# Patient Record
Sex: FEMALE | Race: WHITE | NOT HISPANIC OR LATINO | Employment: STUDENT | URBAN - NONMETROPOLITAN AREA
[De-identification: names, ages, dates, MRNs, and addresses within clinical notes are randomized per-mention and may not be internally consistent; named-entity substitution may affect disease eponyms.]

---
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
## 2020-03-10 ENCOUNTER — HEALTH MAINTENANCE LETTER (OUTPATIENT)
Age: 20
End: 2020-03-10

## 2020-03-27 ENCOUNTER — OFFICE VISIT (OUTPATIENT)
Dept: FAMILY MEDICINE | Facility: OTHER | Age: 20
End: 2020-03-27
Attending: FAMILY MEDICINE
Payer: COMMERCIAL

## 2020-03-27 VITALS
WEIGHT: 127 LBS | DIASTOLIC BLOOD PRESSURE: 72 MMHG | HEART RATE: 79 BPM | HEIGHT: 66 IN | BODY MASS INDEX: 20.41 KG/M2 | SYSTOLIC BLOOD PRESSURE: 110 MMHG | OXYGEN SATURATION: 99 % | TEMPERATURE: 99 F | RESPIRATION RATE: 16 BRPM

## 2020-03-27 DIAGNOSIS — J45.990 EXERCISE-INDUCED BRONCHOSPASM: ICD-10-CM

## 2020-03-27 PROCEDURE — 99213 OFFICE O/P EST LOW 20 MIN: CPT | Performed by: FAMILY MEDICINE

## 2020-03-27 RX ORDER — HYDROXYZINE HYDROCHLORIDE 25 MG/1
25-50 TABLET, FILM COATED ORAL EVERY 6 HOURS PRN
COMMUNITY
Start: 2020-01-28 | End: 2021-07-16

## 2020-03-27 RX ORDER — ALBUTEROL SULFATE 90 UG/1
2 AEROSOL, METERED RESPIRATORY (INHALATION) EVERY 4 HOURS PRN
Qty: 1 INHALER | Refills: 1 | Status: SHIPPED | OUTPATIENT
Start: 2020-03-27 | End: 2021-07-16

## 2020-03-27 ASSESSMENT — PAIN SCALES - GENERAL: PAINLEVEL: NO PAIN (0)

## 2020-03-27 ASSESSMENT — MIFFLIN-ST. JEOR: SCORE: 1366.79

## 2020-03-27 NOTE — PROGRESS NOTES
SUBJECTIVE:   CC:  Marietta Lentz is a 20 year old female who presents to clinic today for the following health issues:  Refill of inhaler    HPI  Marietta Lentz is a 20 year old female in no apparent distress who presents for refill of albuterol inhaler. She has a history of exercise-induced bronchospasm and stopped using her inhaler about 18 months ago as her breathing had gradually improved and she felt it was no longer needed.    About six months ago she noticed a gradual return in episodes of shortness of breath that are triggered by and worse with physical activity.     She reports feeling short of breath and wheezing on a frequency of 3-5 times per week or more.    She has experienced night time awakenings about 3 times per week during this time period.    She has not noticed any specific triggers such as mold, dust, or pet dander. She denies smoking and vapping. No recent sick contacts, change in living environment, or travel.     No syncopal episodes associated with these episodes but feels she has been close.       No Known Allergies  Current Outpatient Medications   Medication     albuterol (PROAIR HFA/PROVENTIL HFA/VENTOLIN HFA) 108 (90 Base) MCG/ACT inhaler     beclomethasone HFA (QVAR REDIHALER) 40 MCG/ACT inhaler     hydrOXYzine (ATARAX) 25 MG tablet     norethindrone-ethinyl estradiol (ORTHO-NOVUM 1-35 TAB,NORTREL 1-35 TAB) 1-35 MG-MCG tablet     sertraline (ZOLOFT) 50 MG tablet     No current facility-administered medications for this visit.       Past Medical History:   Diagnosis Date     Acne     7/12/2013     Exercise-induced bronchospasm     EIB        Review of Systems   Review Of Systems  Skin: negative, negative for, acne, rash, itching  Ears/Nose/Throat: negative for, nasal congestion, purulent rhinorrhea, sneezing, postnasal drainage  Respiratory: Shortness of breath worse with physical activity, No cough and No hemoptysis  Cardiovascular: negative for, palpitations, tachycardia and  "chest pain    OBJECTIVE:     /72   Pulse 79   Temp 99  F (37.2  C) (Tympanic)   Resp 16   Ht 1.683 m (5' 6.25\")   Wt 57.6 kg (127 lb)   LMP 03/07/2020   SpO2 99%   Breastfeeding No   BMI 20.34 kg/m    Body mass index is 20.34 kg/m .  Physical Exam  Constitutional:       Appearance: Normal appearance. She is normal weight.   HENT:      Head: Normocephalic and atraumatic.      Nose: Nose normal.      Mouth/Throat:      Mouth: Mucous membranes are moist.      Pharynx: Oropharynx is clear.   Eyes:      Pupils: Pupils are equal, round, and reactive to light.   Cardiovascular:      Rate and Rhythm: Normal rate and regular rhythm.      Pulses: Normal pulses.      Heart sounds: Normal heart sounds.   Pulmonary:      Effort: Pulmonary effort is normal.      Breath sounds: Normal breath sounds. No wheezing, rhonchi or rales.   Skin:     General: Skin is warm and dry.   Neurological:      Mental Status: She is alert.        No results found for any visits on 03/27/20.      ASSESSMENT/PLAN:       ICD-10-CM    1. Exercise-induced bronchospasm  J45.990 albuterol (PROAIR HFA/PROVENTIL HFA/VENTOLIN HFA) 108 (90 Base) MCG/ACT inhaler     beclomethasone HFA (QVAR REDIHALER) 40 MCG/ACT inhaler        PLAN:  1.  Symptoms consistent with prior diagnosis. No recent sick contacts, change in living environment, or travel. Restart rescue inhaler as well as steroid. Patient was well informed of proper technique including use of spacer to optimize dosing.     ANNIE Bryant   This patient was cared for while under the direct supervision of the following provider.    KARINA MAGDALENO MD  [facility]    This note was created using voice recognition software and was screened for errors in transcription.  "

## 2020-04-13 ENCOUNTER — MYC MEDICAL ADVICE (OUTPATIENT)
Dept: FAMILY MEDICINE | Facility: OTHER | Age: 20
End: 2020-04-13

## 2020-04-14 ENCOUNTER — OFFICE VISIT (OUTPATIENT)
Dept: FAMILY MEDICINE | Facility: OTHER | Age: 20
End: 2020-04-14
Attending: FAMILY MEDICINE
Payer: COMMERCIAL

## 2020-04-14 ENCOUNTER — HOSPITAL ENCOUNTER (OUTPATIENT)
Dept: GENERAL RADIOLOGY | Facility: OTHER | Age: 20
End: 2020-04-14
Attending: FAMILY MEDICINE
Payer: COMMERCIAL

## 2020-04-14 VITALS
DIASTOLIC BLOOD PRESSURE: 78 MMHG | HEIGHT: 66 IN | SYSTOLIC BLOOD PRESSURE: 122 MMHG | WEIGHT: 128.4 LBS | HEART RATE: 119 BPM | OXYGEN SATURATION: 99 % | BODY MASS INDEX: 20.63 KG/M2 | RESPIRATION RATE: 16 BRPM | TEMPERATURE: 97.3 F

## 2020-04-14 DIAGNOSIS — R22.32 LOCALIZED SWELLING ON LEFT HAND: ICD-10-CM

## 2020-04-14 DIAGNOSIS — R22.32 LOCALIZED SWELLING ON LEFT HAND: Primary | ICD-10-CM

## 2020-04-14 PROCEDURE — 99213 OFFICE O/P EST LOW 20 MIN: CPT | Performed by: FAMILY MEDICINE

## 2020-04-14 PROCEDURE — 73130 X-RAY EXAM OF HAND: CPT | Mod: LT

## 2020-04-14 ASSESSMENT — PAIN SCALES - GENERAL: PAINLEVEL: MODERATE PAIN (4)

## 2020-04-14 ASSESSMENT — MIFFLIN-ST. JEOR: SCORE: 1373.14

## 2020-04-14 NOTE — PROGRESS NOTES
"  SUBJECTIVE:   Marietta Lentz is a 20 year old female who presents to clinic today for the following health issues:  Nursing Notes:   Monica Martinez LPN  4/14/2020  2:54 PM  Sign at exiting of workspace  Chief Complaint   Patient presents with     Musculoskeletal Problem     left hand     Patient is here for pain in her left hand that started Saturday after she fell on it. Patient states she had ibuprofen at 10am with no relief.     Initial /78   Pulse 119   Temp 97.3  F (36.3  C) (Tympanic)   Resp 16   Ht 1.683 m (5' 6.25\")   Wt 58.2 kg (128 lb 6.4 oz)   SpO2 99%   BMI 20.57 kg/m   Estimated body mass index is 20.57 kg/m  as calculated from the following:    Height as of this encounter: 1.683 m (5' 6.25\").    Weight as of this encounter: 58.2 kg (128 lb 6.4 oz).  Medication Reconciliation: complete    Monica Martinez LPN      HPI  Pleasant 20-year-old female presents with left hand injury.  Fell backwards and hyper flexed her wrist.  This occurred 3 days ago.  She has been using ibuprofen and icing.  Swelling has improved slightly.  No bruising.        Review of Systems   See HPI  OBJECTIVE:     /78   Pulse 119   Temp 97.3  F (36.3  C) (Tympanic)   Resp 16   Ht 1.683 m (5' 6.25\")   Wt 58.2 kg (128 lb 6.4 oz)   SpO2 99%   BMI 20.57 kg/m    Body mass index is 20.57 kg/m .  Physical Exam  Musculoskeletal:        Hands:       Comments: Mild swelling, no bruising  Decreased ability to make fist  Diffuse tenderness, no focal bony tenderness  Pulses intact  Sensation normal    Tender 1/2nd left MC   Neurological:      Mental Status: She is alert.         Diagnostic Test Results:  No results found for this or any previous visit (from the past 24 hour(s)).    ASSESSMENT/PLAN:           ICD-10-CM    1. Localized swelling on left hand  R22.32 XR Hand Left G/E 3 Views       I reviewed x-rays with the patient.  No obvious fracture.  Recommend ice and some wrist plan for comfort.  Follow-up if no " improvement in 7 to 10 days and can consider repeat x-ray.  Melissa Winn MD  [facility] AND [facility]

## 2020-05-13 DIAGNOSIS — N92.6 IRREGULAR PERIODS: ICD-10-CM

## 2020-05-14 RX ORDER — NORETHINDRONE AND ETHINYL ESTRADIOL 1 MG-35MCG
KIT ORAL
Qty: 84 TABLET | Refills: 2 | Status: SHIPPED | OUTPATIENT
Start: 2020-05-14 | End: 2020-11-02

## 2020-05-28 ENCOUNTER — HOSPITAL ENCOUNTER (OUTPATIENT)
Dept: GENERAL RADIOLOGY | Facility: OTHER | Age: 20
End: 2020-05-28
Attending: FAMILY MEDICINE
Payer: COMMERCIAL

## 2020-05-28 ENCOUNTER — OFFICE VISIT (OUTPATIENT)
Dept: FAMILY MEDICINE | Facility: OTHER | Age: 20
End: 2020-05-28
Attending: FAMILY MEDICINE
Payer: COMMERCIAL

## 2020-05-28 VITALS
TEMPERATURE: 98 F | BODY MASS INDEX: 20.06 KG/M2 | HEIGHT: 67 IN | DIASTOLIC BLOOD PRESSURE: 78 MMHG | WEIGHT: 127.8 LBS | RESPIRATION RATE: 16 BRPM | SYSTOLIC BLOOD PRESSURE: 112 MMHG | HEART RATE: 100 BPM

## 2020-05-28 DIAGNOSIS — M25.571 ACUTE RIGHT ANKLE PAIN: ICD-10-CM

## 2020-05-28 DIAGNOSIS — S93.401A SPRAIN OF RIGHT ANKLE, UNSPECIFIED LIGAMENT, INITIAL ENCOUNTER: ICD-10-CM

## 2020-05-28 DIAGNOSIS — M79.671 RIGHT FOOT PAIN: ICD-10-CM

## 2020-05-28 DIAGNOSIS — M25.571 ACUTE RIGHT ANKLE PAIN: Primary | ICD-10-CM

## 2020-05-28 PROCEDURE — 99213 OFFICE O/P EST LOW 20 MIN: CPT | Performed by: FAMILY MEDICINE

## 2020-05-28 PROCEDURE — 73630 X-RAY EXAM OF FOOT: CPT | Mod: RT

## 2020-05-28 PROCEDURE — 73610 X-RAY EXAM OF ANKLE: CPT | Mod: RT

## 2020-05-28 ASSESSMENT — ENCOUNTER SYMPTOMS
CHILLS: 0
COUGH: 0
ARTHRALGIAS: 1
FEVER: 0

## 2020-05-28 ASSESSMENT — MIFFLIN-ST. JEOR: SCORE: 1381.2

## 2020-05-28 ASSESSMENT — PAIN SCALES - GENERAL: PAINLEVEL: MODERATE PAIN (5)

## 2020-05-28 ASSESSMENT — PATIENT HEALTH QUESTIONNAIRE - PHQ9: SUM OF ALL RESPONSES TO PHQ QUESTIONS 1-9: 0

## 2020-05-28 NOTE — PROGRESS NOTES
"  SUBJECTIVE:   Nursing Notes:   Sharron Israel LPN  5/28/2020 10:03 AM  Signed  Patient presents to the clinic today with complaints of R ankle pain.  Patient states on Saturday she stepped in a hole and twisted it.  Sharron Israel LPN 5/28/2020   9:59 AM    Chief Complaint   Patient presents with     Musculoskeletal Problem     R Ankle       Initial /78 (BP Location: Right arm, Patient Position: Sitting, Cuff Size: Adult Regular)   Pulse 100   Temp 98  F (36.7  C) (Tympanic)   Resp 16   Ht 1.7 m (5' 6.93\")   Wt 58 kg (127 lb 12.8 oz)   LMP 05/14/2020 (Within Days)   Breastfeeding No   BMI 20.06 kg/m   Estimated body mass index is 20.06 kg/m  as calculated from the following:    Height as of this encounter: 1.7 m (5' 6.93\").    Weight as of this encounter: 58 kg (127 lb 12.8 oz).  Medication Reconciliation: complete  Sharron Israel LPN      Marietta Lentz is a 20 year old female who presents to clinic today for a complaint of right ankle pain.  She had stepped in a hole and twisted in 5 days ago.  Had been carrying a 40 lb bag of dirt and did not see the hole.  She was able to put weight on it right away, but had significant swelling right away.  Feels sore and swollen the more she is on it.      HPI    I personally reviewed medications/allergies/history listed below:    Patient Active Problem List    Diagnosis Date Noted     Acne vulgaris 06/08/2016     Priority: Medium     Patella-femoral syndrome 01/22/2014     Priority: Medium     Exercise-induced bronchospasm 07/08/2011     Priority: Medium     Overview:   trial of albuterol, improved symptoms with sports Kely Sherwood MD ....................  3/6/2015   4:36 PM        Past Medical History:   Diagnosis Date     Acne     7/12/2013     Exercise-induced bronchospasm     EIB      Past Surgical History:   Procedure Laterality Date     ESOPHAGOSCOPY, GASTROSCOPY, DUODENOSCOPY (EGD), COMBINED      1/6/2016     TONSILLECTOMY, " "ADENOIDECTOMY, COMBINED      11/6/12,obstructive sleep symptoms     Family History   Problem Relation Age of Onset     Blood Disease Mother         Blood Disease,h/o Protein S and ? 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  Mom- Ashley Ruiz - Mikal  Brother Fabio   Sister Deirdre     Current Outpatient Medications   Medication Sig Dispense Refill     albuterol (PROAIR HFA/PROVENTIL HFA/VENTOLIN HFA) 108 (90 Base) MCG/ACT inhaler Inhale 2 puffs into the lungs every 4 hours as needed for shortness of breath / dyspnea or wheezing 1 Inhaler 1     beclomethasone HFA (QVAR REDIHALER) 40 MCG/ACT inhaler Inhale 2 puffs into the lungs 2 times daily 10.6 g 3     DASETTA 1/35 1-35 MG-MCG tablet TAKE 1 TABLET BY MOUTH ONCE DAILY 84 tablet 2     hydrOXYzine (ATARAX) 25 MG tablet Take 25-50 mg by mouth every 6 hours as needed       sertraline (ZOLOFT) 50 MG tablet Take 50 mg by mouth daily       No Known Allergies    Review of Systems   Constitutional: Negative for chills and fever.   Respiratory: Negative for cough.    Musculoskeletal: Positive for arthralgias.        OBJECTIVE:     /78 (BP Location: Right arm, Patient Position: Sitting, Cuff Size: Adult Regular)   Pulse 100   Temp 98  F (36.7  C) (Tympanic)   Resp 16   Ht 1.7 m (5' 6.93\")   Wt 58 kg (127 lb 12.8 oz)   LMP 05/14/2020 (Within Days)   Breastfeeding No   BMI 20.06 kg/m    Body mass index is 20.06 kg/m .  Physical Exam  Constitutional:       Appearance: Normal appearance.   Musculoskeletal:      " Comments: Right foot/ankle: She has tenderness over the lateral malleolus and the soft tissue surrounding it.  There is some mild swelling and bruising in this area as well.  Minimal pain over the medial malleolus.  Also a small amount of pain over the base of the fifth metatarsal.  No navicular bone pain.  Negative anterior drawer test.  Decreased range of motion with dorsi and plantarflexion of the ankle as well as inversion/eversion of ankle.   Neurological:      Mental Status: She is alert.           PHQ-9 SCORE 6/18/2019 11/13/2019 5/28/2020   PHQ-9 Total Score 12 0 0       PHQ-2 Score:     PHQ-2 ( 1999 [employer]) 4/14/2020 10/2/2018   Q1: Little interest or pleasure in doing things 0 0   Q2: Feeling down, depressed or hopeless 0 0   PHQ-2 Score 0 0       JOSE-7 SCORE 4/9/2019 6/18/2019   Total Score 16 19         ACT Total Scores 6/18/2019 11/13/2019   ACT TOTAL SCORE (Goal Greater than or Equal to 20) 22 25   In the past 12 months, how many times did you visit the emergency room for your asthma without being admitted to the hospital? 0 0   In the past 12 months, how many times were you hospitalized overnight because of your asthma? 0 0         I personally reviewed results withpatient as listed below:   Diagnostic Test Results:  Exam: XR ANKLE RT G/E 3 VW, XR FOOT RT G/E 3 VW      History:Female, age 20 years, Acute right ankle pain     Comparison:  None     Technique: Three views of the right ankle and right foot are  submitted.     Findings: Bones are normally mineralized. No evidence of acute or  subacute fracture.  No evidence of dislocation.                                                                           Impression:  No evidence of acute or subacute bony abnormality. Normal examination  of the right ankle and right foot.     LETTY NEVILLE MD        Exam: XR ANKLE RT G/E 3 VW, XR FOOT RT G/E 3 VW      History:Female, age 20 years, Acute right ankle pain     Comparison:  None     Technique: Three  views of the right ankle and right foot are  submitted.     Findings: Bones are normally mineralized. No evidence of acute or  subacute fracture.  No evidence of dislocation.                                                                           Impression:  No evidence of acute or subacute bony abnormality. Normal examination  of the right ankle and right foot.     LETTY NEVILLE MD      ASSESSMENT/PLAN:       ICD-10-CM    1. Acute right ankle pain  M25.571 XR Ankle Right G/E 3 Views   2. Right foot pain  M79.671 XR Foot Right G/E 3 Views   3. Sprain of right ankle, unspecified ligament, initial encounter  S93.401A        1.  Discussed that her findings on exam and x-ray are consistent with sprain.  Recommended rest/ice, compression and elevation.  Discussed range of motion and proprioceptive exercises that can be done to help speed in her healing.  Follow-up if symptoms are worsening or not improving over the next several weeks.  Discussed that normal healing for a sprain may be 6 to 8 weeks.  2.  See #1.  3.  See #1.    Alba Lin MD  [facility] AND [facility]    Portions of this dictation were created using the Dragon Nuance voice recognition system. Proofreading was completed but there may be errors in text.

## 2020-05-28 NOTE — NURSING NOTE
"Patient presents to the clinic today with complaints of R ankle pain.  Patient states on Saturday she stepped in a hole and twisted it.  Sharron Israel LPN 5/28/2020   9:59 AM    Chief Complaint   Patient presents with     Musculoskeletal Problem     R Ankle       Initial /78 (BP Location: Right arm, Patient Position: Sitting, Cuff Size: Adult Regular)   Pulse 100   Temp 98  F (36.7  C) (Tympanic)   Resp 16   Ht 1.7 m (5' 6.93\")   Wt 58 kg (127 lb 12.8 oz)   LMP 05/14/2020 (Within Days)   Breastfeeding No   BMI 20.06 kg/m   Estimated body mass index is 20.06 kg/m  as calculated from the following:    Height as of this encounter: 1.7 m (5' 6.93\").    Weight as of this encounter: 58 kg (127 lb 12.8 oz).  Medication Reconciliation: complete  Sharron Israel LPN    "

## 2020-05-28 NOTE — PATIENT INSTRUCTIONS
Treating Ankle Sprains  Treatment will depend on how bad your sprain is. For a severe sprain, healing may take 3 months or more.  Right after your injury: Use R.I.C.E.    BIG: Rest: At first, keep weight off the ankle as much as you can. You may be given crutches to help you walk without putting weight on the ankle.    BIG: Ice: Put an ice pack on the ankle for 20 minutes. Remove the pack and wait at least 30 minutes. Repeat for up to 3 days. This helps reduce swelling.    BIG: Compression: To reduce swelling and keep the joint stable, you may need to wrap the ankle with an elastic bandage. For more severe sprains, you may need an ankle brace, a boot, or a cast.    BIG: Elevation: To reduce swelling, keep your ankle raised above your heart when you sit or lie down.  Medicine  Your healthcare provider may suggest oral nonsteroidal anti-inflammatory medicine (NSAIDs), such as ibuprofen. This relieves the pain and helps reduce swelling. Be sure to take your medicine as directed.  Exercises    After about 2 to 3 weeks, you may be given exercises to strengthen the ligaments and muscles in the ankle. Doing these exercises will help prevent another ankle sprain. Exercises may include standing on your toes and then on your heels and doing ankle curls.    Sit on the edge of a sturdy table or lie on your back.    Pull your toes toward you. Then point them away from you. Repeat for 2 to 3 minutes.  Date Last Reviewed: 1/1/2018 2000-2019 [employer]. [address] [place] [address], [place] [address]. All rights reserved. This information is not intended as a substitute for professional medical care. Always follow your healthcare professional's instructions.

## 2020-08-13 DIAGNOSIS — N92.6 IRREGULAR PERIODS: ICD-10-CM

## 2020-08-13 RX ORDER — NORETHINDRONE AND ETHINYL ESTRADIOL 1 MG-35MCG
KIT ORAL
Qty: 84 TABLET | Refills: 2 | OUTPATIENT
Start: 2020-08-13

## 2020-10-26 ENCOUNTER — APPOINTMENT (OUTPATIENT)
Dept: OCCUPATIONAL MEDICINE | Facility: OTHER | Age: 20
End: 2020-10-26
Attending: FAMILY MEDICINE
Payer: COMMERCIAL

## 2020-10-26 PROCEDURE — 90686 IIV4 VACC NO PRSV 0.5 ML IM: CPT

## 2020-10-26 PROCEDURE — 90471 IMMUNIZATION ADMIN: CPT

## 2020-11-02 DIAGNOSIS — N92.6 IRREGULAR PERIODS: ICD-10-CM

## 2020-11-03 RX ORDER — NORETHINDRONE AND ETHINYL ESTRADIOL 1 MG-35MCG
1 KIT ORAL DAILY
Qty: 84 TABLET | Refills: 2 | Status: SHIPPED | OUTPATIENT
Start: 2020-11-03 | End: 2021-01-25

## 2020-11-03 NOTE — TELEPHONE ENCOUNTER
[id number] sent Rx request for the following:   norethindrone-ethinyl estradiol (DASETTA 1/35) 1-35 MG-MCG tablet  Sig:Take 1 tablet by mouth daily    Last Prescription Date:   05/14/2020  Last Fill Qty/Refills:         84, R-2    Last Office Visit:              05/28/2020 ([facility])   Future Office visit:           None noted   Contraceptives Protocol Passed - 11/2/2020  2:03 PM     Prescription approved per [facility] Refill Protocol.  Rachel Lane RN ....................  11/3/2020   9:47 AM

## 2020-12-11 ENCOUNTER — MYC MEDICAL ADVICE (OUTPATIENT)
Dept: FAMILY MEDICINE | Facility: OTHER | Age: 20
End: 2020-12-11

## 2020-12-11 DIAGNOSIS — Z71.89 EDUCATED ABOUT COVID-19 VIRUS INFECTION: ICD-10-CM

## 2020-12-11 DIAGNOSIS — U07.1 INFECTION DUE TO 2019 NOVEL CORONAVIRUS: Primary | ICD-10-CM

## 2020-12-13 NOTE — TELEPHONE ENCOUNTER
Patient is having constant shortness of breath since being diagnosed positive with Covid.  She is experiencing tingling in both hands.  After she uses her rescue inhaler, the tingling goes away.  Please advise.    Ashley Shelby LPN............12/13/2020 10:46 AM        Call placed to patient in regards to her symptoms.  She tested positive for Covid on 12/9/2020, her test was the day prior.  The test was performed at [place] in [facility].  She is still experiencing cough, shortness of breath and overall chest tightness.  She no longer has fevers.  She does report a history of asthma, she does take a rescue inhaler that resolves the symptoms to a point.  Symptoms are worse when she is walking and moving.  During her phone call she did not sound like she was short of breath, completing her sentences with no problem.  She sounded as though she was not in any distress.    However given her symptoms, she was given education about COVID-19.  And she was also encouraged to come in and be seen.  Primarily being worried about her O2 saturation.  Patient will take this into consideration and follow-up as needed.    Rea Andrade NP on 12/13/2020 at 12:04 PM

## 2020-12-27 ENCOUNTER — HEALTH MAINTENANCE LETTER (OUTPATIENT)
Age: 20
End: 2020-12-27

## 2021-01-22 ENCOUNTER — PATIENT OUTREACH (OUTPATIENT)
Dept: FAMILY MEDICINE | Facility: OTHER | Age: 21
End: 2021-01-22

## 2021-01-22 NOTE — TELEPHONE ENCOUNTER
Patient Quality Outreach      Summary:    Patient has the following on her problem list/HM:   Asthma review       ACT Total Scores 11/13/2019   ACT TOTAL SCORE (Goal Greater than or Equal to 20) 25   In the past 12 months, how many times did you visit the emergency room for your asthma without being admitted to the hospital? 0   In the past 12 months, how many times were you hospitalized overnight because of your asthma? 0          Patient is due/failing the following:   ACT needed    Type of outreach:    Sent [employer] message.    Questions for provider review:    None                                                                                                                                     {Cony Mujica LPN .......  1/22/2021  4:14 PM

## 2021-01-24 ENCOUNTER — MYC MEDICAL ADVICE (OUTPATIENT)
Dept: FAMILY MEDICINE | Facility: OTHER | Age: 21
End: 2021-01-24

## 2021-01-24 DIAGNOSIS — N92.6 IRREGULAR PERIODS: ICD-10-CM

## 2021-01-25 RX ORDER — NORETHINDRONE AND ETHINYL ESTRADIOL 1 MG-35MCG
1 KIT ORAL DAILY
Qty: 84 TABLET | Refills: 3 | Status: SHIPPED | OUTPATIENT
Start: 2021-01-25 | End: 2023-01-10

## 2021-04-24 ENCOUNTER — HEALTH MAINTENANCE LETTER (OUTPATIENT)
Age: 21
End: 2021-04-24

## 2021-07-16 ENCOUNTER — OFFICE VISIT (OUTPATIENT)
Dept: FAMILY MEDICINE | Facility: OTHER | Age: 21
End: 2021-07-16
Attending: FAMILY MEDICINE
Payer: COMMERCIAL

## 2021-07-16 VITALS
OXYGEN SATURATION: 94 % | WEIGHT: 122 LBS | RESPIRATION RATE: 20 BRPM | TEMPERATURE: 97 F | DIASTOLIC BLOOD PRESSURE: 70 MMHG | HEART RATE: 85 BPM | BODY MASS INDEX: 19.15 KG/M2 | HEIGHT: 67 IN | SYSTOLIC BLOOD PRESSURE: 120 MMHG

## 2021-07-16 DIAGNOSIS — F41.9 ANXIETY AND DEPRESSION: ICD-10-CM

## 2021-07-16 DIAGNOSIS — Z23 NEED FOR TDAP VACCINATION: ICD-10-CM

## 2021-07-16 DIAGNOSIS — Z30.9 ENCOUNTER FOR CONTRACEPTIVE MANAGEMENT, UNSPECIFIED TYPE: ICD-10-CM

## 2021-07-16 DIAGNOSIS — J45.990 EXERCISE-INDUCED BRONCHOSPASM: ICD-10-CM

## 2021-07-16 DIAGNOSIS — N92.6 IRREGULAR PERIODS: ICD-10-CM

## 2021-07-16 DIAGNOSIS — Z00.00 HEALTH CARE MAINTENANCE: Primary | ICD-10-CM

## 2021-07-16 DIAGNOSIS — F32.A ANXIETY AND DEPRESSION: ICD-10-CM

## 2021-07-16 DIAGNOSIS — H61.22 IMPACTED CERUMEN OF LEFT EAR: ICD-10-CM

## 2021-07-16 PROCEDURE — 90471 IMMUNIZATION ADMIN: CPT | Performed by: FAMILY MEDICINE

## 2021-07-16 PROCEDURE — 90715 TDAP VACCINE 7 YRS/> IM: CPT | Performed by: FAMILY MEDICINE

## 2021-07-16 PROCEDURE — 99395 PREV VISIT EST AGE 18-39: CPT | Mod: 25 | Performed by: FAMILY MEDICINE

## 2021-07-16 RX ORDER — ALBUTEROL SULFATE 90 UG/1
2 AEROSOL, METERED RESPIRATORY (INHALATION) EVERY 4 HOURS PRN
Qty: 18 G | Refills: 11 | Status: SHIPPED | OUTPATIENT
Start: 2021-07-16 | End: 2023-01-10

## 2021-07-16 RX ORDER — HYDROXYZINE HYDROCHLORIDE 25 MG/1
25-50 TABLET, FILM COATED ORAL EVERY 6 HOURS PRN
Qty: 60 TABLET | Refills: 11 | Status: CANCELLED | OUTPATIENT
Start: 2021-07-16

## 2021-07-16 ASSESSMENT — ENCOUNTER SYMPTOMS
SHORTNESS OF BREATH: 0
COUGH: 0
FEVER: 0
NERVOUS/ANXIOUS: 0
CHILLS: 0

## 2021-07-16 ASSESSMENT — ANXIETY QUESTIONNAIRES
IF YOU CHECKED OFF ANY PROBLEMS ON THIS QUESTIONNAIRE, HOW DIFFICULT HAVE THESE PROBLEMS MADE IT FOR YOU TO DO YOUR WORK, TAKE CARE OF THINGS AT HOME, OR GET ALONG WITH OTHER PEOPLE: NOT DIFFICULT AT ALL
1. FEELING NERVOUS, ANXIOUS, OR ON EDGE: NOT AT ALL
2. NOT BEING ABLE TO STOP OR CONTROL WORRYING: NOT AT ALL
7. FEELING AFRAID AS IF SOMETHING AWFUL MIGHT HAPPEN: NOT AT ALL
3. WORRYING TOO MUCH ABOUT DIFFERENT THINGS: NOT AT ALL
5. BEING SO RESTLESS THAT IT IS HARD TO SIT STILL: NOT AT ALL
GAD7 TOTAL SCORE: 0
6. BECOMING EASILY ANNOYED OR IRRITABLE: NOT AT ALL

## 2021-07-16 ASSESSMENT — PAIN SCALES - GENERAL: PAINLEVEL: NO PAIN (0)

## 2021-07-16 ASSESSMENT — MIFFLIN-ST. JEOR: SCORE: 1351.02

## 2021-07-16 ASSESSMENT — PATIENT HEALTH QUESTIONNAIRE - PHQ9
5. POOR APPETITE OR OVEREATING: NOT AT ALL
SUM OF ALL RESPONSES TO PHQ QUESTIONS 1-9: 0

## 2021-07-16 NOTE — PROGRESS NOTES
"  SUBJECTIVE:   Nursing Notes:   Deneen Ingram LPN  7/16/2021  9:39 AM  Sign at exiting of workspace  Chief Complaint   Patient presents with     Physical     birth control     FOOD SECURITY SCREENING QUESTIONS  Hunger Vital Signs:  Within the past 12 months we worried whether our food would run out before we got money to buy more. Never  Within the past 12 months the food we bought just didn't last and we didn't have money to get more. Never  Deneen Ingram LPN 7/16/2021 9:38 AM    Initial /70 (BP Location: Right arm, Patient Position: Sitting, Cuff Size: Adult Regular)   Pulse 85   Temp 97  F (36.1  C) (Tympanic)   Resp 20   Ht 1.702 m (5' 7\")   Wt 55.3 kg (122 lb)   LMP 06/16/2021   SpO2 94%   BMI 19.11 kg/m   Estimated body mass index is 19.11 kg/m  as calculated from the following:    Height as of this encounter: 1.702 m (5' 7\").    Weight as of this encounter: 55.3 kg (122 lb).  Medication Reconciliation: complete    Deneen Ingram LPN      Marietta Lentz is a 21 year old female who presents to clinic today for a physical.    She is seeing Dr. Layton for management of her anxiety and depression. She is taking zoloft 50 mg daily and is doing well with that.      Would like to start a different birth control.  Feels like her current oral contraceptives might be making her acne worse.  She is still having a lot of break through bleeding with the current prescription.  She has some friends who use the mirena iud and like it.  She is interested in having one placed.  She is sexually active.   We discussed risks of IUD to includeperforation of the uterus (requiring surgery), migration of device (possibly requiring D&C for removal), expulsion of device, unpredictable bleeding, possible infertility if exposed to STDs.        HPI    I personally reviewed medications/allergies/history listed below:    Patient Active Problem List    Diagnosis Date Noted     Anxiety and depression 07/16/2021    "  Priority: Medium     Acne vulgaris 06/08/2016     Priority: Medium     Patella-femoral syndrome 01/22/2014     Priority: Medium     Exercise-induced bronchospasm 07/08/2011     Priority: Medium     Overview:   trial of albuterol, improved symptoms with sports Kely Sherwood MD ....................  3/6/2015   4:36 PM        Past Medical History:   Diagnosis Date     Acne     7/12/2013     Exercise-induced bronchospasm     EIB      Past Surgical History:   Procedure Laterality Date     ESOPHAGOSCOPY, GASTROSCOPY, DUODENOSCOPY (EGD), COMBINED      1/6/2016     TONSILLECTOMY, ADENOIDECTOMY, COMBINED      11/6/12,obstructive sleep symptoms     Family History   Problem Relation Age of Onset     Blood Disease Mother         Blood Disease,h/o Protein S and ? 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  Mom- Ashley  Dad - Mikal  Brother Fabio   Sister Deirdre     Current Outpatient Medications   Medication Sig Dispense Refill     albuterol (PROAIR HFA/PROVENTIL HFA/VENTOLIN HFA) 108 (90 Base) MCG/ACT inhaler Inhale 2 puffs into the lungs every 4 hours as needed for shortness of breath / dyspnea or wheezing 18 g 11     beclomethasone HFA (QVAR REDIHALER) 40 MCG/ACT inhaler Inhale 2 puffs into the lungs 2 times daily 10.6 g 11     norethindrone-ethinyl estradiol (DASETTA 1/35) 1-35 MG-MCG tablet Take 1 tablet by mouth daily 84 tablet 3     sertraline (ZOLOFT) 50 MG tablet Take 50 mg by mouth daily       No Known Allergies    Review of Systems   Constitutional: Negative  "for chills and fever.   Respiratory: Negative for cough and shortness of breath.    Cardiovascular: Negative for peripheral edema.   Psychiatric/Behavioral: Negative for mood changes. The patient is not nervous/anxious.         OBJECTIVE:     /70 (BP Location: Right arm, Patient Position: Sitting, Cuff Size: Adult Regular)   Pulse 85   Temp 97  F (36.1  C) (Tympanic)   Resp 20   Ht 1.702 m (5' 7\")   Wt 55.3 kg (122 lb)   LMP 06/16/2021   SpO2 94%   BMI 19.11 kg/m    Body mass index is 19.11 kg/m .  Physical Exam  Constitutional:       Appearance: She is well-developed.   HENT:      Head: Normocephalic.      Right Ear: Tympanic membrane and external ear normal.      Left Ear: External ear normal.      Ears:      Comments: Left TM occluded with cerumen.     Nose: Nose normal.   Eyes:      Pupils: Pupils are equal, round, and reactive to light.   Neck:      Thyroid: No thyromegaly.   Cardiovascular:      Rate and Rhythm: Normal rate and regular rhythm.      Heart sounds: Normal heart sounds. No murmur heard.     Pulmonary:      Effort: Pulmonary effort is normal. No respiratory distress.      Breath sounds: Normal breath sounds. No wheezing or rales.   Chest:      Chest wall: No tenderness.   Abdominal:      General: Bowel sounds are normal. There is no distension.      Palpations: Abdomen is soft. There is no mass.      Tenderness: There is no abdominal tenderness. There is no guarding or rebound.   Musculoskeletal:         General: No tenderness or deformity.      Cervical back: Normal range of motion and neck supple.   Lymphadenopathy:      Cervical: No cervical adenopathy.   Skin:     General: Skin is warm and dry.   Neurological:      Mental Status: She is alert and oriented to person, place, and time.      Cranial Nerves: No cranial nerve deficit.      Coordination: Coordination normal.   Psychiatric:         Mood and Affect: Mood normal.         Behavior: Behavior normal.           PHQ-9 SCORE " 11/13/2019 5/28/2020 7/16/2021   PHQ-9 Total Score 0 0 0         JOSE-7 SCORE 4/9/2019 6/18/2019 7/16/2021   Total Score 16 19 0         ACT Total Scores 6/18/2019 11/13/2019   ACT TOTAL SCORE (Goal Greater than or Equal to 20) 22 25   In the past 12 months, how many times did you visit the emergency room for your asthma without being admitted to the hospital? 0 0   In the past 12 months, how many times were you hospitalized overnight because of your asthma? 0 0         I personally reviewed results withpatient as listed below:   Diagnostic Test Results:  none     ASSESSMENT/PLAN:       ICD-10-CM    1. Health care maintenance  Z00.00    2. Exercise-induced bronchospasm  J45.990 albuterol (PROAIR HFA/PROVENTIL HFA/VENTOLIN HFA) 108 (90 Base) MCG/ACT inhaler     beclomethasone HFA (QVAR REDIHALER) 40 MCG/ACT inhaler   3. Anxiety and depression  F41.9     F32.9    4. Irregular periods  N92.6    5. Encounter for contraceptive management, unspecified type  Z30.9    6. Need for Tdap vaccination  Z23 TDAP VACCINE (Adacel, Boostrix)  [[id number]]   7. Impacted cerumen of left ear  H61.22        1.  Discussed that now that she is 21 years old, she is due for a Pap Smear.  Since she is going to be proceeding with an IUD, will plan to complete a Pap Smear at the same time.  She also agrees to Gonorrhea/Chlamydia screening at that time.  2.  Stable.  Albuterol and Qvar inhalers refilled.  3.  Stable.  Following with Psychiatry.  4.  Discussed risks of Mirena IUD as above.  She is interested in this and will make an appointment to proceed with this at a later date.  Discussed that she should not have unprotected sex within 2 weeks of placement of the IUD and would recommend continuing to use condoms for sexually transmitted infection prevention as well.  5.  See #4.  She will continue with her oral contraceptives until she returns to have this placed.  6.  Tdap updated as noted above.  She declines Bexsero for now.  She is not  living in dormitories at [facility].  Covid vaccine discussed.  She is still undecided as to whether she will get this.  Scheduling information give if she decides to proceed.  7.  Left ear irrigated today.    Alba Lin MD  [facility] AND [facility]

## 2021-07-16 NOTE — PATIENT INSTRUCTIONS
Covid vaccine information:  Thanks for reaching out to us. At this time we are vaccinating all people age 12 and older.   Beginning June 28th, you may make an appointment, walk-in or receive your vaccine after your clinic appointment anytime between 8am-12pm and 1pm-4:30pm Monday - Friday.   To schedule your appointment please log in to [employer] to see when and where we have openings. Appointments open up throughout the week, check back for additional openings. You can also schedule an appointment by calling our appointment line at [phone], weekdays 7:00AM - 5:00 PM.   We appreciate your patience as we work to vaccinate as many people as we can as quickly, safely and efficiently as possible.

## 2021-07-16 NOTE — NURSING NOTE
"Chief Complaint   Patient presents with     Physical     birth control     FOOD SECURITY SCREENING QUESTIONS  Hunger Vital Signs:  Within the past 12 months we worried whether our food would run out before we got money to buy more. Never  Within the past 12 months the food we bought just didn't last and we didn't have money to get more. Never  Deneen Ingram LPN 7/16/2021 9:38 AM    Initial /70 (BP Location: Right arm, Patient Position: Sitting, Cuff Size: Adult Regular)   Pulse 85   Temp 97  F (36.1  C) (Tympanic)   Resp 20   Ht 1.702 m (5' 7\")   Wt 55.3 kg (122 lb)   LMP 06/16/2021   SpO2 94%   BMI 19.11 kg/m   Estimated body mass index is 19.11 kg/m  as calculated from the following:    Height as of this encounter: 1.702 m (5' 7\").    Weight as of this encounter: 55.3 kg (122 lb).  Medication Reconciliation: complete    Deneen Ingram LPN  "

## 2021-07-16 NOTE — NURSING NOTE
"Chief Complaint   Patient presents with     Physical     birth control     FOOD SECURITY SCREENING QUESTIONS  Hunger Vital Signs:  Within the past 12 months we worried whether our food would run out before we got money to buy more. Never  Within the past 12 months the food we bought just didn't last and we didn't have money to get more. Never  Deneen Ingram LPN 7/16/2021 10:14 AM       The canal was irrigated with body-temperature tap water withthe jet of water directed superiorly.  The ear canal was then re-examined and cleared of the impaction.  The patient tolerated the procedure well.    Initial /70 (BP Location: Right arm, Patient Position: Sitting, Cuff Size: Adult Regular)   Pulse 85   Temp 97  F (36.1  C) (Tympanic)   Resp 20   Ht 1.702 m (5' 7\")   Wt 55.3 kg (122 lb)   LMP 06/16/2021   SpO2 94%   BMI 19.11 kg/m   Estimated body mass index is 19.11 kg/m  as calculated from the following:    Height as of this encounter: 1.702 m (5' 7\").    Weight as of this encounter: 55.3 kg (122 lb).  Medication Reconciliation: complete    Deneen Ingram LPN  "

## 2021-07-17 ASSESSMENT — ANXIETY QUESTIONNAIRES: GAD7 TOTAL SCORE: 0

## 2021-08-19 ENCOUNTER — OFFICE VISIT (OUTPATIENT)
Dept: FAMILY MEDICINE | Facility: OTHER | Age: 21
End: 2021-08-19
Attending: FAMILY MEDICINE
Payer: COMMERCIAL

## 2021-08-19 VITALS
RESPIRATION RATE: 16 BRPM | BODY MASS INDEX: 19.77 KG/M2 | HEART RATE: 106 BPM | DIASTOLIC BLOOD PRESSURE: 60 MMHG | WEIGHT: 126.2 LBS | TEMPERATURE: 98.8 F | OXYGEN SATURATION: 99 % | SYSTOLIC BLOOD PRESSURE: 110 MMHG

## 2021-08-19 DIAGNOSIS — Z12.4 SCREENING FOR CERVICAL CANCER: ICD-10-CM

## 2021-08-19 DIAGNOSIS — Z30.430 ENCOUNTER FOR INSERTION OF INTRAUTERINE CONTRACEPTIVE DEVICE (IUD): Primary | ICD-10-CM

## 2021-08-19 DIAGNOSIS — Z11.3 ROUTINE SCREENING FOR STI (SEXUALLY TRANSMITTED INFECTION): ICD-10-CM

## 2021-08-19 LAB
C TRACH DNA SPEC QL PROBE+SIG AMP: NEGATIVE
HCG UR QL: NEGATIVE
N GONORRHOEA DNA SPEC QL NAA+PROBE: NEGATIVE

## 2021-08-19 PROCEDURE — 81025 URINE PREGNANCY TEST: CPT | Mod: ZL | Performed by: FAMILY MEDICINE

## 2021-08-19 PROCEDURE — 58300 INSERT INTRAUTERINE DEVICE: CPT | Performed by: FAMILY MEDICINE

## 2021-08-19 PROCEDURE — 250N000011 HC RX IP 250 OP 636: Performed by: FAMILY MEDICINE

## 2021-08-19 PROCEDURE — 87491 CHLMYD TRACH DNA AMP PROBE: CPT | Mod: ZL | Performed by: FAMILY MEDICINE

## 2021-08-19 PROCEDURE — G0123 SCREEN CERV/VAG THIN LAYER: HCPCS | Performed by: FAMILY MEDICINE

## 2021-08-19 RX ORDER — OXYCODONE HYDROCHLORIDE 5 MG/1
TABLET ORAL
COMMUNITY
Start: 2021-08-12 | End: 2022-02-14

## 2021-08-19 RX ORDER — IBUPROFEN 800 MG/1
TABLET, FILM COATED ORAL
COMMUNITY
Start: 2021-08-12 | End: 2022-02-14

## 2021-08-19 RX ADMIN — LEVONORGESTREL 20 MCG: 52 INTRAUTERINE DEVICE INTRAUTERINE at 16:18

## 2021-08-19 ASSESSMENT — ANXIETY QUESTIONNAIRES
4. TROUBLE RELAXING: SEVERAL DAYS
1. FEELING NERVOUS, ANXIOUS, OR ON EDGE: SEVERAL DAYS
3. WORRYING TOO MUCH ABOUT DIFFERENT THINGS: NOT AT ALL
GAD7 TOTAL SCORE: 4
GAD7 TOTAL SCORE: 4
6. BECOMING EASILY ANNOYED OR IRRITABLE: SEVERAL DAYS
GAD7 TOTAL SCORE: 4
7. FEELING AFRAID AS IF SOMETHING AWFUL MIGHT HAPPEN: NOT AT ALL
5. BEING SO RESTLESS THAT IT IS HARD TO SIT STILL: NOT AT ALL
2. NOT BEING ABLE TO STOP OR CONTROL WORRYING: SEVERAL DAYS
7. FEELING AFRAID AS IF SOMETHING AWFUL MIGHT HAPPEN: NOT AT ALL
8. IF YOU CHECKED OFF ANY PROBLEMS, HOW DIFFICULT HAVE THESE MADE IT FOR YOU TO DO YOUR WORK, TAKE CARE OF THINGS AT HOME, OR GET ALONG WITH OTHER PEOPLE?: SOMEWHAT DIFFICULT

## 2021-08-19 ASSESSMENT — PATIENT HEALTH QUESTIONNAIRE - PHQ9
SUM OF ALL RESPONSES TO PHQ QUESTIONS 1-9: 0
SUM OF ALL RESPONSES TO PHQ QUESTIONS 1-9: 0

## 2021-08-19 ASSESSMENT — ENCOUNTER SYMPTOMS
SHORTNESS OF BREATH: 0
CHILLS: 0
NERVOUS/ANXIOUS: 0
FEVER: 0
COUGH: 0

## 2021-08-19 ASSESSMENT — PAIN SCALES - GENERAL: PAINLEVEL: NO PAIN (0)

## 2021-08-19 NOTE — PROGRESS NOTES
"  SUBJECTIVE:   Nursing Notes:   Ben Holloway LPN  8/19/2021  3:19 PM  Signed  Chief Complaint   Patient presents with     Gyn Exam   Patient is here for birth control. Patient has no specific concerns or issues at this time.    Initial /60   Pulse 106   Temp 98.8  F (37.1  C) (Tympanic)   Resp 16   Wt 57.2 kg (126 lb 3.2 oz)   LMP 07/26/2021 (Exact Date)   SpO2 99%   Breastfeeding No   BMI 19.77 kg/m   Estimated body mass index is 19.77 kg/m  as calculated from the following:    Height as of 7/16/21: 1.702 m (5' 7\").    Weight as of this encounter: 57.2 kg (126 lb 3.2 oz).  Medication Reconciliation: complete    FOOD SECURITY SCREENING QUESTIONS  Hunger Vital Signs:  Within the past 12 months we worried whether our food would run out before we got money to buy more. Never  Within the past 12 months the food we bought just didn't last and we didn't have money to get more. Never      Advanced Care Directive Reviewed    Ben Holloway LPN      Marietta Lentz is a 21 year old female who presents to clinic today for a Pap Smear and mirena IUD insertion.   We discussed risks of IUD to includeperforation of the uterus (requiring surgery), migration of device (possibly requiring D&C for removal), expulsion of device, unpredictable bleeding, possible infertility if exposed to STDs.  She understands and wishes to procede.  She did wish to be screened for Gonorrhea/Chlamydia as well today.  She has never had a Pap Smear prior to today.  She is sexually active and has been using oral contraceptives for birth control.  She states that she has not had any unprotected sex in the past 2 weeks.        HPI    I personally reviewed medications/allergies/history listed below:    Patient Active Problem List    Diagnosis Date Noted     Anxiety and depression 07/16/2021     Priority: Medium     Acne vulgaris 06/08/2016     Priority: Medium     Patella-femoral syndrome 01/22/2014     Priority: Medium     " Exercise-induced bronchospasm 07/08/2011     Priority: Medium     Overview:   trial of albuterol, improved symptoms with sports Kely Sherwood MD ....................  3/6/2015   4:36 PM        Past Medical History:   Diagnosis Date     Acne     7/12/2013     Exercise-induced bronchospasm     EIB      Past Surgical History:   Procedure Laterality Date     ESOPHAGOSCOPY, GASTROSCOPY, DUODENOSCOPY (EGD), COMBINED      1/6/2016     TONSILLECTOMY, ADENOIDECTOMY, COMBINED      11/6/12,obstructive sleep symptoms     Family History   Problem Relation Age of Onset     Blood Disease Mother         Blood Disease,h/o Protein S and ? 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 THEN 1 TABLET EVERY 6 HOURS FOR THE FIRST 2 DAYS. THEN EVERY 6 HOURS AS NEEDED.       norethindrone-ethinyl estradiol (DASETTA 1/35) 1-35 MG-MCG tablet Take 1 tablet by mouth daily 84 tablet 3     oxyCODONE (ROXICODONE) 5 MG tablet TAKE 1 TABLET BY MOUTH EVERY 4 HOURS AS NEEDED FOR BREAKTHROUGH PAIN.        sertraline (ZOLOFT) 50 MG tablet Take 50 mg by mouth daily       No Known Allergies    Review of Systems   Constitutional: Negative for chills and fever.   Respiratory: Negative for cough and shortness of breath.    Cardiovascular: Negative for peripheral edema.   Psychiatric/Behavioral: Negative for mood changes. The patient is not nervous/anxious.         OBJECTIVE:     /60   Pulse 106   Temp 98.8  F (37.1  C) (Tympanic)   Resp 16   Wt 57.2 kg (126 lb 3.2 oz)   LMP 07/26/2021 (Exact Date)   SpO2 99%   Breastfeeding No   BMI 19.77 kg/m    Body mass index is 19.77 kg/m .  Physical Exam  Constitutional:       Appearance: Normal appearance.   Genitourinary:     Comments: Procedure:  External genitalia is normal without any lesions.  Normal hair distribution.  Speculum placed.  Cervix is pink without lesions.  Pap Smear and Gonorrhea/Chlamydia obtained.  Cervix is cleansed with betadine.  Anterior cervix was grasped with single toothed tenaculum.  Uterus sounded to 7 cm.  Mirena insertion device passed through cervix without difficulty.  IUD deployed.  Strings trimmed to 3 cm.  Instrumentation removed.  Bimanual exam showed no cervical motion or uterine tenderness.  Strings palpable. Uterus and adnexa normal in size without masses.    Neurological:      Mental Status: She is alert.           PHQ-9 SCORE 5/28/2020 7/16/2021 8/19/2021   PHQ-9 Total Score MyChart - - 0   PHQ-9 Total Score 0 0 0       PHQ-2 Score:     PHQ-2 ( 1999 Pfizer) 4/14/2020 10/2/2018   Q1: Little interest or pleasure in doing things 0 0   Q2: Feeling down, depressed or hopeless 0 0   PHQ-2 Score 0 0       JOSE-7 SCORE 6/18/2019 7/16/2021 8/19/2021   Total Score - - 4 (minimal anxiety)   Total Score 19 0 4         ACT Total Scores 6/18/2019 11/13/2019   ACT TOTAL SCORE (Goal Greater than or Equal to 20) 22 25   In the past 12 months, how many times did you visit the emergency room for your asthma without being admitted to the hospital?  0 0   In the past 12 months, how many times were you hospitalized overnight because of your asthma? 0 0         I personally reviewed results withpatient as listed below:   Diagnostic Test Results:  Results for orders placed or performed in visit on 08/19/21 (from the past 24 hour(s))   Pregnancy, Urine (HCG)   Result Value Ref Range    hCG Urine Qualitative Negative Negative   GC/Chlamydia by PCR    Specimen: Endocervix; Swab   Result Value Ref Range    Chlamydia Trachomatis Negative Negative    Neisseria gonorrhoeae Negative Negative    Narrative    Assay performed using Student Designed real-time, reverse-transcriptase PCR.       ASSESSMENT/PLAN:       ICD-10-CM    1. Encounter for insertion of intrauterine contraceptive device (IUD)  Z30.430 Pregnancy, Urine (HCG)     Insertion of an IUD     levonorgestrel (MIRENA) 20 MCG/24HR IUD 20 mcg     CANCELED: Pregnancy, Urine (HCG)     CANCELED: Pregnancy, Urine (HCG)   2. Screening for cervical cancer  Z12.4 Pap Screen Thin Prep only - recommended age 21 - 24 years   3. Routine screening for STI (sexually transmitted infection)  Z11.3 GC/Chlamydia by PCR     CANCELED: GC/Chlamydia by PCR       1.  Mirena IUD placed today as above.  Discussed this will be due for removal/replacement by 8/19/2027.  Follow up if any concerns.  2.  Pap Smear completed today as above.  3.  Gonorrhea/Chlamydia screening completed as above.    Alba Lin MD  [facility]        Answers for HPI/ROS submitted by the patient on 8/19/2021  PHQ9 TOTAL SCORE: 0  JOSE 7 TOTAL SCORE: 4

## 2021-08-19 NOTE — NURSING NOTE
"Chief Complaint   Patient presents with     Gyn Exam   Patient is here for birth control. Patient has no specific concerns or issues at this time.    Initial /60   Pulse 106   Temp 98.8  F (37.1  C) (Tympanic)   Resp 16   Wt 57.2 kg (126 lb 3.2 oz)   LMP 07/26/2021 (Exact Date)   SpO2 99%   Breastfeeding No   BMI 19.77 kg/m   Estimated body mass index is 19.77 kg/m  as calculated from the following:    Height as of 7/16/21: 1.702 m (5' 7\").    Weight as of this encounter: 57.2 kg (126 lb 3.2 oz).  Medication Reconciliation: complete    FOOD SECURITY SCREENING QUESTIONS  Hunger Vital Signs:  Within the past 12 months we worried whether our food would run out before we got money to buy more. Never  Within the past 12 months the food we bought just didn't last and we didn't have money to get more. Never      Advanced Care Directive Reviewed    Ben Holloway LPN  "

## 2021-08-20 ASSESSMENT — ANXIETY QUESTIONNAIRES: GAD7 TOTAL SCORE: 4

## 2021-08-20 ASSESSMENT — PATIENT HEALTH QUESTIONNAIRE - PHQ9: SUM OF ALL RESPONSES TO PHQ QUESTIONS 1-9: 0

## 2021-08-23 LAB
BKR LAB AP GYN ADEQUACY: NORMAL
BKR LAB AP GYN INTERPRETATION: NORMAL
BKR LAB AP HPV REFLEX: NO
BKR LAB AP PREVIOUS ABNORMAL: NORMAL
PATH REPORT.RELEVANT HX SPEC: NORMAL

## 2021-10-09 ENCOUNTER — HEALTH MAINTENANCE LETTER (OUTPATIENT)
Age: 21
End: 2021-10-09

## 2022-02-14 ENCOUNTER — OFFICE VISIT (OUTPATIENT)
Dept: FAMILY MEDICINE | Facility: OTHER | Age: 22
End: 2022-02-14
Attending: FAMILY MEDICINE
Payer: COMMERCIAL

## 2022-02-14 VITALS
SYSTOLIC BLOOD PRESSURE: 116 MMHG | DIASTOLIC BLOOD PRESSURE: 72 MMHG | HEART RATE: 82 BPM | RESPIRATION RATE: 16 BRPM | OXYGEN SATURATION: 97 % | WEIGHT: 124.4 LBS | TEMPERATURE: 98 F | BODY MASS INDEX: 19.48 KG/M2

## 2022-02-14 DIAGNOSIS — N64.4 BREAST PAIN, LEFT: ICD-10-CM

## 2022-02-14 DIAGNOSIS — R14.0 ABDOMINAL BLOATING: Primary | ICD-10-CM

## 2022-02-14 LAB
ALBUMIN SERPL-MCNC: 4.8 G/DL (ref 3.5–5.7)
ALBUMIN UR-MCNC: 30 MG/DL
ALP SERPL-CCNC: 72 U/L (ref 34–104)
ALT SERPL W P-5'-P-CCNC: 11 U/L (ref 7–52)
ANION GAP SERPL CALCULATED.3IONS-SCNC: 6 MMOL/L (ref 3–14)
APPEARANCE UR: CLEAR
AST SERPL W P-5'-P-CCNC: 13 U/L (ref 13–39)
BACTERIA #/AREA URNS HPF: ABNORMAL /HPF
BASOPHILS # BLD AUTO: 0.1 10E3/UL (ref 0–0.2)
BASOPHILS NFR BLD AUTO: 1 %
BILIRUB SERPL-MCNC: 0.6 MG/DL (ref 0.3–1)
BILIRUB UR QL STRIP: NEGATIVE
BUN SERPL-MCNC: 9 MG/DL (ref 7–25)
CALCIUM SERPL-MCNC: 9.7 MG/DL (ref 8.6–10.3)
CHLORIDE BLD-SCNC: 106 MMOL/L (ref 98–107)
CO2 SERPL-SCNC: 27 MMOL/L (ref 21–31)
COLOR UR AUTO: YELLOW
CREAT SERPL-MCNC: 0.99 MG/DL (ref 0.6–1.2)
EOSINOPHIL # BLD AUTO: 0.1 10E3/UL (ref 0–0.7)
EOSINOPHIL NFR BLD AUTO: 2 %
ERYTHROCYTE [DISTWIDTH] IN BLOOD BY AUTOMATED COUNT: 13.7 % (ref 10–15)
GFR SERPL CREATININE-BSD FRML MDRD: 83 ML/MIN/1.73M2
GLUCOSE BLD-MCNC: 89 MG/DL (ref 70–105)
GLUCOSE UR STRIP-MCNC: NEGATIVE MG/DL
HCG UR QL: NEGATIVE
HCT VFR BLD AUTO: 43.3 % (ref 35–47)
HGB BLD-MCNC: 14.5 G/DL (ref 11.7–15.7)
HGB UR QL STRIP: NEGATIVE
HYALINE CASTS: 1 /LPF
IMM GRANULOCYTES # BLD: 0 10E3/UL
IMM GRANULOCYTES NFR BLD: 0 %
KETONES UR STRIP-MCNC: NEGATIVE MG/DL
LEUKOCYTE ESTERASE UR QL STRIP: ABNORMAL
LIPASE SERPL-CCNC: 22 U/L (ref 11–82)
LYMPHOCYTES # BLD AUTO: 2.5 10E3/UL (ref 0.8–5.3)
LYMPHOCYTES NFR BLD AUTO: 41 %
MCH RBC QN AUTO: 29.6 PG (ref 26.5–33)
MCHC RBC AUTO-ENTMCNC: 33.5 G/DL (ref 31.5–36.5)
MCV RBC AUTO: 88 FL (ref 78–100)
MONOCYTES # BLD AUTO: 0.5 10E3/UL (ref 0–1.3)
MONOCYTES NFR BLD AUTO: 8 %
MUCOUS THREADS #/AREA URNS LPF: PRESENT /LPF
NEUTROPHILS # BLD AUTO: 2.9 10E3/UL (ref 1.6–8.3)
NEUTROPHILS NFR BLD AUTO: 48 %
NITRATE UR QL: NEGATIVE
NRBC # BLD AUTO: 0 10E3/UL
NRBC BLD AUTO-RTO: 0 /100
PH UR STRIP: 5.5 [PH] (ref 5–9)
PLATELET # BLD AUTO: 317 10E3/UL (ref 150–450)
POTASSIUM BLD-SCNC: 3.9 MMOL/L (ref 3.5–5.1)
PROT SERPL-MCNC: 6.7 G/DL (ref 6.4–8.9)
RBC # BLD AUTO: 4.9 10E6/UL (ref 3.8–5.2)
RBC URINE: 1 /HPF
SODIUM SERPL-SCNC: 139 MMOL/L (ref 134–144)
SP GR UR STRIP: 1.03 (ref 1–1.03)
SQUAMOUS EPITHELIAL: 8 /HPF
UROBILINOGEN UR STRIP-MCNC: NORMAL MG/DL
WBC # BLD AUTO: 6.1 10E3/UL (ref 4–11)
WBC URINE: 11 /HPF

## 2022-02-14 PROCEDURE — 80053 COMPREHEN METABOLIC PANEL: CPT | Mod: ZL | Performed by: FAMILY MEDICINE

## 2022-02-14 PROCEDURE — 36415 COLL VENOUS BLD VENIPUNCTURE: CPT | Mod: ZL | Performed by: FAMILY MEDICINE

## 2022-02-14 PROCEDURE — 85025 COMPLETE CBC W/AUTO DIFF WBC: CPT | Mod: ZL | Performed by: FAMILY MEDICINE

## 2022-02-14 PROCEDURE — 81001 URINALYSIS AUTO W/SCOPE: CPT | Mod: ZL | Performed by: FAMILY MEDICINE

## 2022-02-14 PROCEDURE — 81025 URINE PREGNANCY TEST: CPT | Mod: ZL | Performed by: FAMILY MEDICINE

## 2022-02-14 PROCEDURE — 83690 ASSAY OF LIPASE: CPT | Mod: ZL | Performed by: FAMILY MEDICINE

## 2022-02-14 PROCEDURE — 86364 TISS TRNSGLTMNASE EA IG CLAS: CPT | Mod: ZL | Performed by: FAMILY MEDICINE

## 2022-02-14 PROCEDURE — 87086 URINE CULTURE/COLONY COUNT: CPT | Mod: ZL | Performed by: FAMILY MEDICINE

## 2022-02-14 PROCEDURE — 99213 OFFICE O/P EST LOW 20 MIN: CPT | Performed by: FAMILY MEDICINE

## 2022-02-14 RX ORDER — HYDROXYZINE HYDROCHLORIDE 25 MG/1
TABLET, FILM COATED ORAL
COMMUNITY
Start: 2021-10-18 | End: 2022-12-01

## 2022-02-14 ASSESSMENT — ENCOUNTER SYMPTOMS
CHILLS: 0
FEVER: 0
SHORTNESS OF BREATH: 0
COUGH: 0
NERVOUS/ANXIOUS: 0

## 2022-02-14 ASSESSMENT — PAIN SCALES - GENERAL: PAINLEVEL: NO PAIN (0)

## 2022-02-14 NOTE — NURSING NOTE
Chief Complaint   Patient presents with     Abdominal Pain     Breast Problem     LEFT     Here today with concerns about lower abd pain and bloating for the last 3 months. Denies diarrhea or any patterns with foods. Some nausea. No vomiting. Also notes some breast changes on L side that she is worried about.     Medication Reconciliation: complete    Génesis Caballero, LPN

## 2022-02-14 NOTE — PROGRESS NOTES
SUBJECTIVE:   Nursing Notes:   RomyGénesis novak, LPN  2/14/2022  1:08 PM  Sign at exiting of workspace  Chief Complaint   Patient presents with     Abdominal Pain     Breast Problem     LEFT     Here today with concerns about lower abd pain and bloating for the last 3 months. Denies diarrhea or any patterns with foods. Some nausea. No vomiting. Also notes some breast changes on L side that she is worried about.     Medication Reconciliation: complete    Génesis Caballero LPN        Marietta Lentz is a 21 year old female who presents to clinic today for a complaint of abdominal pain and bloating over the past 3 months.  No diarrhea.  Some nausea, but no vomiting.  When she has pain, it is in her lower abdominal region.  Not related to periods.  No constipation.  Has daily bowel movement, not hard.  Bowel movements do not affect her pain.  Often the pain comes at night, around 8-9 pm.  She tries to sit and not move when the pain occurs.  The pain happens at least 4-5 times per week.  Has tried tums, which has not helps.  Periods are not excessively crampy or heavy.  Has tried avoiding eating too late in the evening.  She already does not eat dairy due to lactose intolerance.  No known family history of celiac disease.  She had an endoscopy around age 15 and had biopsies at that time, which were negative for celiac disease.  She was having very bad acid reflux at that time.  She had been on carafate at that time, which helped.      She has also noticed some left breast changes that she has concerns about.  Her left breast seemed to get larger over a week's time.  Feels like her bra fits tighter during that time.  No lumps have been felt.  Not premenstrual.  It is still enlarged and has been for about 3 weeks.  It does feel more tender as well.  No changes have been noted in her right breast.  She has a mirena IUD in place.  Last menstrual period was 1/12/22.    HPI    I personally reviewed  medications/allergies/history listed below:    Patient Active Problem List    Diagnosis Date Noted     Encounter for insertion of intrauterine contraceptive device (IUD) 08/19/2021     Priority: Medium     Mirena placed 8/19/2021 - due for removal by 8/19/2027.       Anxiety and depression 07/16/2021     Priority: Medium     Acne vulgaris 06/08/2016     Priority: Medium     Patella-femoral syndrome 01/22/2014     Priority: Medium     Exercise-induced bronchospasm 07/08/2011     Priority: Medium     Overview:   trial of albuterol, improved symptoms with sports Kely Sherwood MD ....................  3/6/2015   4:36 PM        Past Medical History:   Diagnosis Date     Acne     7/12/2013     Exercise-induced bronchospasm     EIB      Past Surgical History:   Procedure Laterality Date     ESOPHAGOSCOPY, GASTROSCOPY, DUODENOSCOPY (EGD), COMBINED      1/6/2016     TONSILLECTOMY, ADENOIDECTOMY, COMBINED      11/6/12,obstructive sleep symptoms     Family History   Problem Relation Age of Onset     Blood Disease Mother         Blood Disease,h/o Protein S and ? 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  Mom- Ahsley Ruiz - Mikal  Brother Fabio   Sister Deirdre     Current Outpatient Medications   Medication Sig Dispense Refill     albuterol (PROAIR HFA/PROVENTIL HFA/VENTOLIN HFA) 108 (90 Base) MCG/ACT inhaler Inhale 2 puffs into the lungs every 4 hours as needed for shortness of breath / dyspnea or wheezing 18 g 11     beclomethasone HFA (QVAR REDIHALER) 40 MCG/ACT  inhaler Inhale 2 puffs into the lungs 2 times daily 10.6 g 11     hydrOXYzine (ATARAX) 25 MG tablet TAKE 1-2 TABLETS BY MOUTH EVERY 6 HOURS AS NEEDED FOR ANXIETY       norethindrone-ethinyl estradiol (DASETTA 1/35) 1-35 MG-MCG tablet Take 1 tablet by mouth daily 84 tablet 3     sertraline (ZOLOFT) 50 MG tablet Take 50 mg by mouth daily       No Known Allergies    Review of Systems   Constitutional: Negative for chills and fever.   Respiratory: Negative for cough and shortness of breath.    Breasts:  Positive for tenderness.   Psychiatric/Behavioral: Negative for mood changes. The patient is not nervous/anxious.         OBJECTIVE:     /72 (BP Location: Right arm, Patient Position: Sitting, Cuff Size: Adult Regular)   Pulse 82   Temp 98  F (36.7  C) (Tympanic)   Resp 16   Wt 56.4 kg (124 lb 6.4 oz)   LMP 01/12/2022   SpO2 97%   Breastfeeding No   BMI 19.48 kg/m    Body mass index is 19.48 kg/m .  Physical Exam  Constitutional:       Appearance: Normal appearance.   HENT:      Head: Normocephalic.   Eyes:      Extraocular Movements: Extraocular movements intact.      Pupils: Pupils are equal, round, and reactive to light.   Cardiovascular:      Rate and Rhythm: Normal rate and regular rhythm.      Pulses: Normal pulses.      Heart sounds: Normal heart sounds. No murmur heard.      Pulmonary:      Effort: Pulmonary effort is normal.      Breath sounds: Normal breath sounds. No wheezing, rhonchi or rales.      Comments: Breast exam:  No masses palpable bilaterally.  No skin changes, tethering or axillary lymphadenopathy bilaterally.  Her left breast is significantly larger than her left.    Musculoskeletal:      Cervical back: Normal range of motion and neck supple.   Lymphadenopathy:      Cervical: No cervical adenopathy.   Neurological:      Mental Status: She is alert.           PHQ-9 SCORE 5/28/2020 7/16/2021 8/19/2021   PHQ-9 Total Score MyChart - - 0   PHQ-9 Total Score 0 0 0       PHQ-2 Score:      PHQ-2 ( 1999 Pfizer) 2/14/2022 4/14/2020   Q1: Little interest or pleasure in doing things 0 0   Q2: Feeling down, depressed or hopeless 0 0   PHQ-2 Score 0 0   PHQ-2 Total Score (12-17 Years)- Positive if 3 or more points; Administer PHQ-A if positive - 0   Q1: Little interest or pleasure in doing things Not at all -   Q2: Feeling down, depressed or hopeless Not at all -   PHQ-2 Score 0 -       JOSE-7 SCORE 6/18/2019 7/16/2021 8/19/2021   Total Score - - 4 (minimal anxiety)   Total Score 19 0 4         ACT Total Scores 6/18/2019 11/13/2019   ACT TOTAL SCORE (Goal Greater than or Equal to 20) 22 25   In the past 12 months, how many times did you visit the emergency room for your asthma without being admitted to the hospital? 0 0   In the past 12 months, how many times were you hospitalized overnight because of your asthma? 0 0         I personally reviewed results withpatient as listed below:   Diagnostic Test Results:  none     ASSESSMENT/PLAN:       ICD-10-CM    1. Abdominal bloating  R14.0 Tissue transglutaminase Ab IgA and IgG     CBC and Differential     Comprehensive metabolic panel     Lipase     UA reflex to Microscopic     Urine Culture Aerobic Bacterial     US Pelvic Complete with Transvaginal     Tissue transglutaminase Ab IgA and IgG     CBC and Differential     Comprehensive metabolic panel     Lipase   2. Breast pain, left  N64.4 MA Diagnostic Left w/Juventino     US Breast Left Complete 4 Quadrants     Pregnancy, Urine (HCG)       1.  Labs completed as above.  Obtain pelvic ultrasound for further evaluation.  If work up is normal, recommend colonoscopy and could consider CT of the abdomen & pelvis as well.  Recommended consideration of trying a gluten-free diet for a month to see if this alleviates any of her symptoms.  2.  Urine HCG will be obtained.  Diagnostic mammogram and ultrasound ordered as well.    Alba Lin MD  [facility]

## 2022-02-15 LAB
TTG IGA SER-ACNC: 0.3 U/ML
TTG IGG SER-ACNC: <0.6 U/ML

## 2022-02-16 LAB — BACTERIA UR CULT: NO GROWTH

## 2022-02-17 ENCOUNTER — HOSPITAL ENCOUNTER (OUTPATIENT)
Dept: ULTRASOUND IMAGING | Facility: OTHER | Age: 22
End: 2022-02-17
Attending: FAMILY MEDICINE
Payer: COMMERCIAL

## 2022-02-17 DIAGNOSIS — N64.4 BREAST PAIN, LEFT: ICD-10-CM

## 2022-02-17 DIAGNOSIS — R14.0 ABDOMINAL BLOATING: ICD-10-CM

## 2022-02-17 PROCEDURE — 76642 ULTRASOUND BREAST LIMITED: CPT | Mod: LT

## 2022-02-17 PROCEDURE — 76830 TRANSVAGINAL US NON-OB: CPT

## 2022-02-22 ENCOUNTER — MYC MEDICAL ADVICE (OUTPATIENT)
Dept: FAMILY MEDICINE | Facility: OTHER | Age: 22
End: 2022-02-22
Payer: COMMERCIAL

## 2022-04-17 ENCOUNTER — MYC MEDICAL ADVICE (OUTPATIENT)
Dept: FAMILY MEDICINE | Facility: OTHER | Age: 22
End: 2022-04-17
Payer: COMMERCIAL

## 2022-09-11 ENCOUNTER — HEALTH MAINTENANCE LETTER (OUTPATIENT)
Age: 22
End: 2022-09-11

## 2022-10-11 ENCOUNTER — MYC MEDICAL ADVICE (OUTPATIENT)
Dept: FAMILY MEDICINE | Facility: OTHER | Age: 22
End: 2022-10-11

## 2022-10-11 ASSESSMENT — ASTHMA QUESTIONNAIRES
ACT_TOTALSCORE: 13
QUESTION_4 LAST FOUR WEEKS HOW OFTEN HAVE YOU USED YOUR RESCUE INHALER OR NEBULIZER MEDICATION (SUCH AS ALBUTEROL): TWO OR THREE TIMES PER WEEK
QUESTION_3 LAST FOUR WEEKS HOW OFTEN DID YOUR ASTHMA SYMPTOMS (WHEEZING, COUGHING, SHORTNESS OF BREATH, CHEST TIGHTNESS OR PAIN) WAKE YOU UP AT NIGHT OR EARLIER THAN USUAL IN THE MORNING: FOUR OR MORE NIGHTS A WEEK
ACT_TOTALSCORE: 13
QUESTION_1 LAST FOUR WEEKS HOW MUCH OF THE TIME DID YOUR ASTHMA KEEP YOU FROM GETTING AS MUCH DONE AT WORK, SCHOOL OR AT HOME: SOME OF THE TIME
QUESTION_5 LAST FOUR WEEKS HOW WOULD YOU RATE YOUR ASTHMA CONTROL: WELL CONTROLLED
QUESTION_2 LAST FOUR WEEKS HOW OFTEN HAVE YOU HAD SHORTNESS OF BREATH: ONCE A DAY

## 2022-12-01 ENCOUNTER — MYC REFILL (OUTPATIENT)
Dept: FAMILY MEDICINE | Facility: OTHER | Age: 22
End: 2022-12-01

## 2022-12-01 DIAGNOSIS — F41.9 ANXIETY AND DEPRESSION: Primary | ICD-10-CM

## 2022-12-01 DIAGNOSIS — F32.A ANXIETY AND DEPRESSION: Primary | ICD-10-CM

## 2022-12-02 RX ORDER — HYDROXYZINE HYDROCHLORIDE 25 MG/1
TABLET, FILM COATED ORAL
Qty: 60 TABLET | Refills: 1 | Status: SHIPPED | OUTPATIENT
Start: 2022-12-02 | End: 2024-08-05

## 2022-12-02 NOTE — TELEPHONE ENCOUNTER
[facility] ([facility] of [facility] sent Rx request for the following:      Requested Prescriptions   Pending Prescriptions Disp Refills     sertraline (ZOLOFT) 50 MG tablet 90 tablet 1     Sig: Take 1 tablet (50 mg) by mouth daily       SSRIs Protocol Passed - 12/2/2022  9:01 AM       Last Prescription Date:   7/16/21  Last Fill Qty/Refills:         90, R-3    Last Office Visit:              2/14/22  Future Office visit:           None    Meets refill criteria. Will route to provider. Bree Resendiz RN on 12/2/2022 at 9:50 AM

## 2022-12-02 NOTE — TELEPHONE ENCOUNTER
Routing refill request to provider for review/approval because:    LOV: 2/14/22  Joyce Lopez RN on 12/2/2022 at 9:13 AM

## 2022-12-05 NOTE — TELEPHONE ENCOUNTER
LMTCB to schedule appointment.    Pt is due for annual exam.    Alesia Arellano on 12/5/2022 at 1:32 PM

## 2022-12-07 NOTE — TELEPHONE ENCOUNTER
LMTCB x 2 to schedule appointment.    Pt is due for annual exam.    Alesia Arellano on 12/7/2022 at 10:13 AM

## 2023-01-10 ENCOUNTER — OFFICE VISIT (OUTPATIENT)
Dept: FAMILY MEDICINE | Facility: OTHER | Age: 23
End: 2023-01-10
Attending: FAMILY MEDICINE
Payer: COMMERCIAL

## 2023-01-10 VITALS
TEMPERATURE: 97.7 F | BODY MASS INDEX: 21.99 KG/M2 | DIASTOLIC BLOOD PRESSURE: 64 MMHG | HEART RATE: 91 BPM | WEIGHT: 140.4 LBS | OXYGEN SATURATION: 99 % | SYSTOLIC BLOOD PRESSURE: 98 MMHG

## 2023-01-10 DIAGNOSIS — Z13.29 SCREENING FOR THYROID DISORDER: ICD-10-CM

## 2023-01-10 DIAGNOSIS — Z00.00 HEALTH CARE MAINTENANCE: Primary | ICD-10-CM

## 2023-01-10 DIAGNOSIS — J45.990 EXERCISE-INDUCED BRONCHOSPASM: ICD-10-CM

## 2023-01-10 DIAGNOSIS — M25.50 POLYARTHRALGIA: ICD-10-CM

## 2023-01-10 DIAGNOSIS — F51.01 PRIMARY INSOMNIA: ICD-10-CM

## 2023-01-10 DIAGNOSIS — Z13.220 SCREENING FOR LIPID DISORDERS: ICD-10-CM

## 2023-01-10 DIAGNOSIS — R20.2 PARESTHESIA OF FINGER: ICD-10-CM

## 2023-01-10 DIAGNOSIS — F32.A ANXIETY AND DEPRESSION: ICD-10-CM

## 2023-01-10 DIAGNOSIS — R20.2 PARESTHESIA OF BOTH FEET: ICD-10-CM

## 2023-01-10 DIAGNOSIS — F41.9 ANXIETY AND DEPRESSION: ICD-10-CM

## 2023-01-10 LAB
CHOLEST SERPL-MCNC: 196 MG/DL
CRP SERPL-MCNC: <3 MG/L
ERYTHROCYTE [SEDIMENTATION RATE] IN BLOOD BY WESTERGREN METHOD: 3 MM/HR (ref 0–20)
FASTING STATUS PATIENT QL REPORTED: NO
GLUCOSE SERPL-MCNC: 116 MG/DL (ref 70–99)
HDLC SERPL-MCNC: 77 MG/DL
LDLC SERPL CALC-MCNC: 91 MG/DL
NONHDLC SERPL-MCNC: 119 MG/DL
TRIGL SERPL-MCNC: 142 MG/DL
TSH SERPL DL<=0.005 MIU/L-ACNC: 0.85 UIU/ML (ref 0.3–4.2)
URATE SERPL-MCNC: 4.2 MG/DL (ref 2.4–5.7)
VIT B12 SERPL-MCNC: 549 PG/ML (ref 232–1245)

## 2023-01-10 PROCEDURE — 82607 VITAMIN B-12: CPT | Mod: ZL | Performed by: FAMILY MEDICINE

## 2023-01-10 PROCEDURE — 86038 ANTINUCLEAR ANTIBODIES: CPT | Mod: ZL | Performed by: FAMILY MEDICINE

## 2023-01-10 PROCEDURE — 86431 RHEUMATOID FACTOR QUANT: CPT | Mod: ZL | Performed by: FAMILY MEDICINE

## 2023-01-10 PROCEDURE — 86140 C-REACTIVE PROTEIN: CPT | Mod: ZL | Performed by: FAMILY MEDICINE

## 2023-01-10 PROCEDURE — 86200 CCP ANTIBODY: CPT | Mod: ZL | Performed by: FAMILY MEDICINE

## 2023-01-10 PROCEDURE — 84550 ASSAY OF BLOOD/URIC ACID: CPT | Mod: ZL | Performed by: FAMILY MEDICINE

## 2023-01-10 PROCEDURE — 80061 LIPID PANEL: CPT | Mod: ZL | Performed by: FAMILY MEDICINE

## 2023-01-10 PROCEDURE — 85652 RBC SED RATE AUTOMATED: CPT | Mod: ZL | Performed by: FAMILY MEDICINE

## 2023-01-10 PROCEDURE — 99395 PREV VISIT EST AGE 18-39: CPT | Performed by: FAMILY MEDICINE

## 2023-01-10 PROCEDURE — 82947 ASSAY GLUCOSE BLOOD QUANT: CPT | Mod: ZL | Performed by: FAMILY MEDICINE

## 2023-01-10 PROCEDURE — 36415 COLL VENOUS BLD VENIPUNCTURE: CPT | Mod: ZL | Performed by: FAMILY MEDICINE

## 2023-01-10 PROCEDURE — 84443 ASSAY THYROID STIM HORMONE: CPT | Mod: ZL | Performed by: FAMILY MEDICINE

## 2023-01-10 PROCEDURE — 86618 LYME DISEASE ANTIBODY: CPT | Mod: ZL | Performed by: FAMILY MEDICINE

## 2023-01-10 RX ORDER — SERTRALINE HYDROCHLORIDE 100 MG/1
100 TABLET, FILM COATED ORAL DAILY
Qty: 90 TABLET | Refills: 3 | Status: SHIPPED | OUTPATIENT
Start: 2023-01-10 | End: 2024-08-05

## 2023-01-10 RX ORDER — NORETHINDRONE AND ETHINYL ESTRADIOL 1 MG-35MCG
1 KIT ORAL DAILY
Qty: 84 TABLET | Refills: 3 | Status: CANCELLED | OUTPATIENT
Start: 2023-01-10

## 2023-01-10 RX ORDER — TRAZODONE HYDROCHLORIDE 50 MG/1
50 TABLET, FILM COATED ORAL AT BEDTIME
Qty: 90 TABLET | Refills: 3 | Status: SHIPPED | OUTPATIENT
Start: 2023-01-10 | End: 2024-08-05

## 2023-01-10 RX ORDER — ALBUTEROL SULFATE 90 UG/1
2 AEROSOL, METERED RESPIRATORY (INHALATION) EVERY 4 HOURS PRN
Qty: 18 G | Refills: 11 | Status: SHIPPED | OUTPATIENT
Start: 2023-01-10

## 2023-01-10 ASSESSMENT — ENCOUNTER SYMPTOMS
ABDOMINAL PAIN: 1
PALPITATIONS: 0
FEVER: 0
JOINT SWELLING: 1
PARESTHESIAS: 0
DYSURIA: 0
MYALGIAS: 1
SORE THROAT: 0
CONSTIPATION: 0
WEAKNESS: 0
COUGH: 0
ARTHRALGIAS: 1
CHILLS: 0
FREQUENCY: 0
BREAST MASS: 0
DIARRHEA: 0
HEMATURIA: 0
NAUSEA: 0
NERVOUS/ANXIOUS: 1
DIZZINESS: 1
HEARTBURN: 0
HEMATOCHEZIA: 0
SHORTNESS OF BREATH: 1
HEADACHES: 0
EYE PAIN: 0

## 2023-01-10 ASSESSMENT — PATIENT HEALTH QUESTIONNAIRE - PHQ9
SUM OF ALL RESPONSES TO PHQ QUESTIONS 1-9: 15
10. IF YOU CHECKED OFF ANY PROBLEMS, HOW DIFFICULT HAVE THESE PROBLEMS MADE IT FOR YOU TO DO YOUR WORK, TAKE CARE OF THINGS AT HOME, OR GET ALONG WITH OTHER PEOPLE: SOMEWHAT DIFFICULT
SUM OF ALL RESPONSES TO PHQ QUESTIONS 1-9: 15

## 2023-01-10 ASSESSMENT — ASTHMA QUESTIONNAIRES
QUESTION_2 LAST FOUR WEEKS HOW OFTEN HAVE YOU HAD SHORTNESS OF BREATH: THREE TO SIX TIMES A WEEK
ACT_TOTALSCORE: 20
QUESTION_4 LAST FOUR WEEKS HOW OFTEN HAVE YOU USED YOUR RESCUE INHALER OR NEBULIZER MEDICATION (SUCH AS ALBUTEROL): ONCE A WEEK OR LESS
QUESTION_5 LAST FOUR WEEKS HOW WOULD YOU RATE YOUR ASTHMA CONTROL: WELL CONTROLLED
QUESTION_1 LAST FOUR WEEKS HOW MUCH OF THE TIME DID YOUR ASTHMA KEEP YOU FROM GETTING AS MUCH DONE AT WORK, SCHOOL OR AT HOME: A LITTLE OF THE TIME
ACT_TOTALSCORE: 20
QUESTION_3 LAST FOUR WEEKS HOW OFTEN DID YOUR ASTHMA SYMPTOMS (WHEEZING, COUGHING, SHORTNESS OF BREATH, CHEST TIGHTNESS OR PAIN) WAKE YOU UP AT NIGHT OR EARLIER THAN USUAL IN THE MORNING: NOT AT ALL

## 2023-01-10 NOTE — PROGRESS NOTES
Nursing Notes:   Howard, MONIQUE Tracy  1/10/2023  2:16 PM  Signed  Patient presents to clinic for physical, patient has concerns regarding depression and would like to discuss  BP 98/64   Pulse 91   Temp 97.7  F (36.5  C) (Tympanic)   Wt 63.7 kg (140 lb 6.4 oz)   SpO2 99%   BMI 21.99 kg/m    Rossana Howard MONIQUE on 1/10/2023 at 2:16 PM         SUBJECTIVE:   CC: Marietta is an 22 year old who presents for preventive health visit.Patient has been advised of split billing requirements and indicates understanding: Yes  Healthy Habits:     Getting at least 3 servings of Calcium per day:  Yes    Bi-annual eye exam:  Yes    Dental care twice a year:  Yes    Sleep apnea or symptoms of sleep apnea:  Daytime drowsiness    Diet:  Gluten-free/reduced    Frequency of exercise:  6-7 days/week    Duration of exercise:  Greater than 60 minutes    Taking medications regularly:  Yes    Medication side effects:  None    PHQ-2 Total Score: 4    Additional concerns today:  No      Depression has been worse lately.  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  It has been stressful, but she is feeling like she is managing that ok.  She is in a graduate MH counseling program.    Having a very hard time sleeping.  Falls asleep ok, but wakes up at least 5 times per night.  Has been going on for a long time.  Has tried melatonin.  It helps her to fall asleep faster, but still cannot stay asleep.  Has tried tylenol pm, which did not help.    Has had a constellation of other interesting symptoms lately.  She wonders if they are related to one another.  She has had some rashes intermittently.  She has a picture on her phone of the rash on her legs.  It looks like a reticular rash.  She also has had a similar rash on her cheeks to the sides of her nose.  Both of these rashes come and go and might last a few hours at a time.  Gets tingly fingers and toes.  Brain fog.  Is sensitive to gluten.  Gets joint pain often.  Has had Raynaud's symptoms at times  on her toes.  No personal family history of psoriasis.  Aunt and cousin have autoimmune illnesses, not sure which ones.    ACT Total Scores 10/11/2022 11/18/2022 1/10/2023   ACT TOTAL SCORE (Goal Greater than or Equal to 20) 13 14 20   In the past 12 months, how many times did you visit the emergency room for your asthma without being admitted to the hospital? 0 0 0   In the past 12 months, how many times were you hospitalized overnight because of your asthma? 0 0 0       PHQ 8/19/2021 1/10/2023 1/10/2023   PHQ-9 Total Score 0 15 16   Q9: Thoughts of better off dead/self-harm past 2 weeks Not at all Not at all Not at all         Today's PHQ-2 Score:   PHQ-2 ( 1999 [employer]) 1/10/2023   Q1: Little interest or pleasure in doing things 2   Q2: Feeling down, depressed or hopeless 2   PHQ-2 Score 4   PHQ-2 Total Score (12-17 Years)- Positive if 3 or more points; Administer PHQ-A if positive -   Q1: Little interest or pleasure in doing things More than half the days   Q2: Feeling down, depressed or hopeless Several days   PHQ-2 Score 3       Have you ever done Advance Care Planning? (For example, a Health Directive, POLST, or a discussion with a medical provider or your loved ones about your wishes): No, advance care planning information given to patient to review.  Patient plans to discuss their wishes with loved ones or provider.      Social History     Tobacco Use     Smoking status: Never     Smokeless tobacco: Never     Tobacco comments:     Quit smoking: dad smokes outside   Substance Use Topics     Alcohol use: No     Alcohol/week: 0.0 standard drinks         Alcohol Use 1/10/2023   Prescreen: >3 drinks/day or >7 drinks/week? No       Reviewed orders with patient.  Reviewed health maintenance and updated orders accordingly - Yes  BP Readings from Last 3 Encounters:   01/10/23 98/64   02/14/22 116/72   08/19/21 110/60    Wt Readings from Last 3 Encounters:   01/10/23 63.7 kg (140 lb 6.4 oz)   02/14/22 56.4 kg (124 lb  6.4 oz)   08/19/21 57.2 kg (126 lb 3.2 oz)                  Patient Active Problem List   Diagnosis     Acne vulgaris     Exercise-induced bronchospasm     Patella-femoral syndrome     Anxiety and depression     Encounter for insertion of intrauterine contraceptive device (IUD)     Past Surgical History:   Procedure Laterality Date     ESOPHAGOSCOPY, GASTROSCOPY, DUODENOSCOPY (EGD), COMBINED      1/6/2016     TONSILLECTOMY, ADENOIDECTOMY, COMBINED      11/6/12,obstructive sleep symptoms       Social History     Tobacco Use     Smoking status: Never     Smokeless tobacco: Never     Tobacco comments:     Quit smoking: dad smokes outside   Substance Use Topics     Alcohol use: No     Alcohol/week: 0.0 standard drinks     Family History   Problem Relation Age of Onset     Blood Disease Mother         Blood Disease,h/o Protein S and ? Factor 5 Leiden     Genetic Disorder Other         Genetic,h/o anxiety and depression     Rheumatoid Arthritis Maternal Aunt          Current Outpatient Medications   Medication Sig Dispense Refill     albuterol (PROAIR HFA/PROVENTIL HFA/VENTOLIN HFA) 108 (90 Base) MCG/ACT inhaler Inhale 2 puffs into the lungs every 4 hours as needed for shortness of breath or wheezing 18 g 11     sertraline (ZOLOFT) 100 MG tablet Take 1 tablet (100 mg) by mouth daily 90 tablet 3     traZODone (DESYREL) 50 MG tablet Take 1 tablet (50 mg) by mouth At Bedtime 90 tablet 3     hydrOXYzine (ATARAX) 25 MG tablet TAKE 1-2 TABLETS BY MOUTH EVERY 6 HOURS AS NEEDED FOR ANXIETY 60 tablet 1     No Known Allergies    Breast Cancer Screening:        History of abnormal Pap smear: NO - age 21-29 PAP every 3 years recommended  PAP / HPV Latest Ref Rng & Units 8/19/2021   PAP   Negative for Intraepithelial Lesion or Malignancy (NILM)     Reviewed and updated as needed this visit by clinical staff   Tobacco  Allergies  Meds              Reviewed and updated as needed this visit by Provider                 Past Medical  History:   Diagnosis Date     Acne     7/12/2013     Exercise-induced bronchospasm     EIB      Past Surgical History:   Procedure Laterality Date     ESOPHAGOSCOPY, GASTROSCOPY, DUODENOSCOPY (EGD), COMBINED      1/6/2016     TONSILLECTOMY, ADENOIDECTOMY, COMBINED      11/6/12,obstructive sleep symptoms       Review of Systems   Constitutional: Negative for chills and fever.   HENT: Negative for congestion, ear pain, hearing loss and sore throat.    Eyes: Negative for pain and visual disturbance.   Respiratory: Positive for shortness of breath. Negative for cough.    Cardiovascular: Positive for chest pain. Negative for palpitations and peripheral edema.   Gastrointestinal: Positive for abdominal pain. Negative for constipation, diarrhea, heartburn, hematochezia and nausea.   Breasts:  Negative for tenderness, breast mass and discharge.   Genitourinary: Negative for dysuria, frequency, genital sores, hematuria, pelvic pain, urgency, vaginal bleeding and vaginal discharge.   Musculoskeletal: Positive for arthralgias, joint swelling and myalgias.   Skin: Positive for rash.   Neurological: Positive for dizziness. Negative for weakness, headaches and paresthesias.   Psychiatric/Behavioral: Positive for mood changes. The patient is nervous/anxious.           OBJECTIVE:   BP 98/64   Pulse 91   Temp 97.7  F (36.5  C) (Tympanic)   Wt 63.7 kg (140 lb 6.4 oz)   SpO2 99%   BMI 21.99 kg/m    Physical Exam  Constitutional:       Appearance: She is well-developed.   HENT:      Head: Normocephalic.      Right Ear: External ear normal.      Left Ear: External ear normal.      Nose: Nose normal.   Eyes:      Pupils: Pupils are equal, round, and reactive to light.   Neck:      Thyroid: No thyromegaly.   Cardiovascular:      Rate and Rhythm: Normal rate and regular rhythm.      Heart sounds: Normal heart sounds. No murmur heard.  Pulmonary:      Effort: Pulmonary effort is normal. No respiratory distress.      Breath sounds:  Normal breath sounds. No wheezing or rales.      Comments: Breast exam declined per patient.  Chest:      Chest wall: No tenderness.   Abdominal:      General: Bowel sounds are normal. There is no distension.      Palpations: Abdomen is soft. There is no mass.      Tenderness: There is no abdominal tenderness. There is no guarding or rebound.   Genitourinary:     Comments: Deferred.  Musculoskeletal:         General: No tenderness or deformity.      Cervical back: Normal range of motion and neck supple.   Lymphadenopathy:      Cervical: No cervical adenopathy.   Skin:     General: Skin is warm and dry.      Comments: No swelling or synovitis of joints noted.   Neurological:      Mental Status: She is alert and oriented to person, place, and time.      Cranial Nerves: No cranial nerve deficit.      Coordination: Coordination normal.   Psychiatric:         Behavior: Behavior normal.         Thought Content: Thought content normal.         Judgment: Judgment normal.           ASSESSMENT/PLAN:       ICD-10-CM    1. Health care maintenance  Z00.00       2. Anxiety and depression  F41.9 sertraline (ZOLOFT) 100 MG tablet    F32.A       3. Primary insomnia  F51.01 traZODone (DESYREL) 50 MG tablet     TSH Reflex GH     TSH Reflex GH      4. Polyarthralgia  M25.50 Anti Nuclear Anali IgG by IFA with Reflex     Cyclic Citrullinated Peptide Antibody IgG     CRP inflammation     Erythrocyte sedimentation rate auto     Rheumatoid factor     Lyme Disease Total Abs Bld with Reflex to Confirm CLIA     Uric acid     Uric acid     Lyme Disease Total Abs Bld with Reflex to Confirm CLIA     Rheumatoid factor     Erythrocyte sedimentation rate auto     CRP inflammation     Cyclic Citrullinated Peptide Antibody IgG     Anti Nuclear Anali IgG by IFA with Reflex      5. Paresthesia of finger  R20.2 Vitamin B12     Glucose     Glucose     Vitamin B12      6. Paresthesia of both feet  R20.2 Vitamin B12     Glucose     Glucose     Vitamin B12       7. Exercise-induced bronchospasm  J45.990 albuterol (PROAIR HFA/PROVENTIL HFA/VENTOLIN HFA) 108 (90 Base) MCG/ACT inhaler      8. Irregular periods  N92.6       9. Screening for lipid disorders  Z13.220 Lipid Profile     Lipid Profile      10. Screening for thyroid disorder  Z13.29 TSH Reflex GH     TSH Reflex GH        1.  Pap is up-to-date, last completed 2021 and was normal at that time.  Tdap is up-to-date, last completed 2021.  She declines COVID and flu vaccines.  Also declines HIV and hepatitis C screening as well as gonorrhea and Chlamydia screening.  2.  We will increase Zoloft to 100 mg daily.  She had been on this dose before and felt that it was helpful at the time.  She does have a counselor who she keeps in contact with and will consider reestablishing care again.  Follow-up as needed.  3.  Trial of trazodone 50 mg p.o. nightly for sleep.  Follow-up if not effective.  4.  Labs completed as above looking for potential underlying autoimmune illness.  Her rash pictures on her phone she showed me look like they are likely livedo reticularis.  With her rash on face as well, certainly concern for possible lupus as a cause and labs ordered for evaluation of that possibility.  5.  Labs as above.    6.  Labs as above.  7.  Stable.  Asthma Action Plan updated tdoay.  Albuterol refilled.  8.  Lipid profile as above.  9.  TSH as above.    Patient has been advised of split billing requirements and indicates understanding: Yes      COUNSELING:  Reviewed preventive health counseling, as reflected in patient instructions       Regular exercise       Healthy diet/nutrition       Vision screening       Contraception       Safe sex practices/STD prevention       Consider Hep C screening for all patients one time for ages 18-79 years       HIV screeninx in teen years, 1x in adult years, and at intervals if high risk       Advance Care Planning        She reports that she has never smoked. She has never  used smokeless tobacco.      Alba Lin MD  [facility]  Answers for HPI/ROS submitted by the patient on 1/10/2023  If you checked off any problems, how difficult have these problems made it for you to do your work, take care of things at home, or get along with other people?: Somewhat difficult  PHQ9 TOTAL SCORE: 15

## 2023-01-10 NOTE — LETTER
My Asthma Action Plan    Name: Marietta Lentz   YOB: 2000  Date: 1/10/2023   My doctor: Alba Lin MD   My clinic: [facility]        My Rescue Medicine:   Albuterol inhaler (Proair/Ventolin/Proventil HFA)  2-4 puffs EVERY 4 HOURS as needed. Use a spacer if recommended by your provider.   My Asthma Severity:   Intermittent / Exercise Induced  Know your asthma triggers:              GREEN ZONE   Good Control    I feel good    No cough or wheeze    Can work, sleep and play without asthma symptoms       Take your asthma control medicine every day.     1. If exercise triggers your asthma, take your rescue medication    15 minutes before exercise or sports, and    During exercise if you have asthma symptoms  2. Spacer to use with inhaler: If you have a spacer, make sure to use it with your inhaler             YELLOW ZONE Getting Worse  I have ANY of these:    I do not feel good    Cough or wheeze    Chest feels tight    Wake up at night   1. Keep taking your Green Zone medications  2. Start taking your rescue medicine:    every 20 minutes for up to 1 hour. Then every 4 hours for 24-48 hours.  3. If you stay in the Yellow Zone for more than 12-24 hours, contact your doctor.  4. If you do not return to the Green Zone in 12-24 hours or you get worse, start taking your oral steroid medicine if prescribed by your provider.           RED ZONE Medical Alert - Get Help  I have ANY of these:    I feel awful    Medicine is not helping    Breathing getting harder    Trouble walking or talking    Nose opens wide to breathe       1. Take your rescue medicine NOW  2. If your provider has prescribed an oral steroid medicine, start taking it NOW  3. Call your doctor NOW  4. If you are still in the Red Zone after 20 minutes and you have not reached your doctor:    Take your rescue medicine again and    Call 911 or go to the emergency room right away    See your regular doctor within 2  weeks of an Emergency Room or Urgent Care visit for follow-up treatment.          Annual Reminders:  Meet with Asthma Educator,  Flu Shot in the Fall, consider Pneumonia Vaccination for patients with asthma (aged 19 and older).    Pharmacy: [phone] ([facility]) - [place] [address]    Electronically signed by Alba Lin MD   Date: 01/10/23                    Asthma Triggers  How To Control Things That Make Your Asthma Worse    Triggers are things that make your asthma worse.  Look at the list below to help you find your triggers and   what you can do about them. You can help prevent asthma flare-ups by staying away from your triggers.      Trigger                                                          What you can do   Cigarette Smoke  Tobacco smoke can make asthma worse. Do not allow smoking in your home, car or around you.  Be sure no one smokes at a child s day care or school.  If you smoke, ask your health care provider for ways to help you quit.  Ask family members to quit too.  Ask your health care provider for a referral to Quit Plan to help you quit smoking, or call 1-542-768-PLAN.     Colds, Flu, Bronchitis  These are common triggers of asthma. Wash your hands often.  Don t touch your eyes, nose or mouth.  Get a flu shot every year.     Dust Mites  These are tiny bugs that live in cloth or carpet. They are too small to see. Wash sheets and blankets in hot water every week.   Encase pillows and mattress in dust mite proof covers.  Avoid having carpet if you can. If you have carpet, vacuum weekly.   Use a dust mask and HEPA vacuum.   Pollen and Outdoor Mold  Some people are allergic to trees, grass, or weed pollen, or molds. Try to keep your windows closed.  Limit time out doors when pollen count is high.   Ask you health care provider about taking medicine during allergy season.     Animal Dander  Some people are allergic to skin flakes, urine or saliva from  pets with fur or feathers. Keep pets with fur or feathers out of your home.    If you can t keep the pet outdoors, then keep the pet out of your bedroom.  Keep the bedroom door closed.  Keep pets off cloth furniture and away from stuffed toys.     Mice, Rats, and Cockroaches  Some people are allergic to the waste from these pests.   Cover food and garbage.  Clean up spills and food crumbs.  Store grease in the refrigerator.   Keep food out of the bedroom.   Indoor Mold  This can be a trigger if your home has high moisture. Fix leaking faucets, pipes, or other sources of water.   Clean moldy surfaces.  Dehumidify basement if it is damp and smelly.   Smoke, Strong Odors, and Sprays  These can reduce air quality. Stay away from strong odors and sprays, such as perfume, powder, hair spray, paints, smoke incense, paint, cleaning products, candles and new carpet.   Exercise or Sports  Some people with asthma have this trigger. Be active!  Ask your doctor about taking medicine before sports or exercise to prevent symptoms.    Warm up for 5-10 minutes before and after sports or exercise.     Other Triggers of Asthma  Cold air:  Cover your nose and mouth with a scarf.  Sometimes laughing or crying can be a trigger.  Some medicines and food can trigger asthma.

## 2023-01-10 NOTE — NURSING NOTE
Patient presents to clinic for physical, patient has concerns regarding depression and would like to discuss  BP 98/64   Pulse 91   Temp 97.7  F (36.5  C) (Tympanic)   Wt 63.7 kg (140 lb 6.4 oz)   SpO2 99%   BMI 21.99 kg/m    Rossana Howard LPN on 1/10/2023 at 2:16 PM

## 2023-01-12 LAB
ANA PAT SER IF-IMP: ABNORMAL
ANA SER QL IF: POSITIVE
ANA TITR SER IF: ABNORMAL {TITER}
B BURGDOR IGG+IGM SER QL: 0.03
RHEUMATOID FACT SER NEPH-ACNC: 7 IU/ML

## 2023-01-14 DIAGNOSIS — R76.8 ANA POSITIVE: Primary | ICD-10-CM

## 2023-01-14 LAB — CCP AB SER IA-ACNC: 0.7 U/ML

## 2023-02-17 ENCOUNTER — LAB (OUTPATIENT)
Dept: LAB | Facility: OTHER | Age: 23
End: 2023-02-17
Attending: FAMILY MEDICINE
Payer: COMMERCIAL

## 2023-02-17 DIAGNOSIS — R76.8 ANA POSITIVE: ICD-10-CM

## 2023-02-17 PROCEDURE — 86235 NUCLEAR ANTIGEN ANTIBODY: CPT | Mod: ZL

## 2023-02-17 PROCEDURE — 36415 COLL VENOUS BLD VENIPUNCTURE: CPT | Mod: ZL

## 2023-02-17 PROCEDURE — 86225 DNA ANTIBODY NATIVE: CPT | Mod: ZL

## 2023-02-21 LAB
CENTROMERE B AB SER-ACNC: <0.7 U/ML
CENTROMERE B AB SER-ACNC: NEGATIVE
DSDNA AB SER-ACNC: <0.6 IU/ML
ENA SM IGG SER IA-ACNC: <0.7 U/ML
ENA SM IGG SER IA-ACNC: NEGATIVE
ENA SS-A AB SER IA-ACNC: <0.5 U/ML
ENA SS-A AB SER IA-ACNC: NEGATIVE
ENA SS-B IGG SER IA-ACNC: <0.6 U/ML
ENA SS-B IGG SER IA-ACNC: NEGATIVE
U1 SNRNP IGG SER IA-ACNC: 1.3 U/ML
U1 SNRNP IGG SER IA-ACNC: NEGATIVE

## 2023-02-22 DIAGNOSIS — M25.50 POLYARTHRALGIA: ICD-10-CM

## 2023-02-22 DIAGNOSIS — R76.8 ANA POSITIVE: Primary | ICD-10-CM

## 2023-08-15 ENCOUNTER — PATIENT OUTREACH (OUTPATIENT)
Dept: FAMILY MEDICINE | Facility: OTHER | Age: 23
End: 2023-08-15
Payer: COMMERCIAL

## 2023-08-15 NOTE — LETTER
[facility]  [address]  [place] [id number] [phone]       August 15, 2023    Marietta Lentz  [address]  [place] [address]    Dear Marietta,    We care about your health and have reviewed your health plan and are making recommendations based on this review, to optimize your health.    You are in particular need of attention regarding:  -Chlamydia Screening    We are recommending that you:  -Be tested for Chlamydia      Annual testing is recommended for sexually active women between the ages of 15 and 25.     Chlamydia is the most common bacterial sexually transmitted disease in the [place], according to the Centers for Disease Control (CDC), yet many women considered at risk for the disease do not get the recommended annual screening test.     Chlamydia has no symptoms and left untreated, it can cause infertility and other serious health problems. Chlamydia is easily cured with antibiotics.  We have enclosed a brochure that gives you additional information about Chlamydia.    Testing is now usually done by leaving a urine sample with a lab-only appointment or you can make an office visit if you have other concerns. (If you are not recently or currently sexually active, then please contact us so we can update your medical record.)      In addition, here is a list of due or overdue Health Maintenance reminders.    Health Maintenance Due   Topic Date Due    COVID-19 Vaccine (1) Never done    Asthma Control Test  07/10/2023    Depression Assessment  07/10/2023       To address the above recommendations, we encourage you to contact us at [phone]. They will assist you with finding the most convenient time and location.    Thank you for trusting [facility] and we appreciate the opportunity to serve you.  We look forward to supporting your healthcare needs in the future.    Healthy Regards,    Your [facility] Team

## 2023-08-15 NOTE — TELEPHONE ENCOUNTER
Patient Quality Outreach    Patient is due for the following:   Chlamydia Screening    Next Steps:   Schedule a lab only visit for chlamydia testing    Type of outreach:    Sent letter.      Questions for provider review:    None           Deirdre Wiseman

## 2023-08-21 ENCOUNTER — LAB (OUTPATIENT)
Dept: LAB | Facility: OTHER | Age: 23
End: 2023-08-21
Attending: FAMILY MEDICINE
Payer: COMMERCIAL

## 2023-08-21 DIAGNOSIS — R76.8 POSITIVE ANA (ANTINUCLEAR ANTIBODY): ICD-10-CM

## 2023-08-21 DIAGNOSIS — R23.1 LIVEDO RETICULARIS: ICD-10-CM

## 2023-08-21 PROCEDURE — 36415 COLL VENOUS BLD VENIPUNCTURE: CPT | Mod: ZL

## 2023-08-21 PROCEDURE — 86147 CARDIOLIPIN ANTIBODY EA IG: CPT | Mod: ZL

## 2023-08-21 PROCEDURE — 85390 FIBRINOLYSINS SCREEN I&R: CPT | Mod: 26 | Performed by: PATHOLOGY

## 2023-08-21 PROCEDURE — 85730 THROMBOPLASTIN TIME PARTIAL: CPT | Mod: ZL

## 2023-08-21 PROCEDURE — 86146 BETA-2 GLYCOPROTEIN ANTIBODY: CPT | Mod: ZL

## 2023-08-23 LAB
B2 GLYCOPROT1 IGG SERPL IA-ACNC: 0.9 U/ML
B2 GLYCOPROT1 IGM SERPL IA-ACNC: <2.4 U/ML
CARDIOLIPIN IGG SER IA-ACNC: 5 GPL-U/ML
CARDIOLIPIN IGG SER IA-ACNC: NEGATIVE
CARDIOLIPIN IGM SER IA-ACNC: 2.6 MPL-U/ML
CARDIOLIPIN IGM SER IA-ACNC: NEGATIVE

## 2023-08-24 LAB
DRVVT SCREEN RATIO: 0.94
INR PPP: 0.99 (ref 0.85–1.15)
LA PPP-IMP: NEGATIVE
LUPUS INTERPRETATION: NORMAL
PTT RATIO: 1.08
THROMBIN TIME: 18.3 SECONDS (ref 13–19)

## 2023-09-14 ENCOUNTER — MYC MEDICAL ADVICE (OUTPATIENT)
Dept: FAMILY MEDICINE | Facility: OTHER | Age: 23
End: 2023-09-14
Payer: COMMERCIAL

## 2023-09-14 DIAGNOSIS — F32.A ANXIETY AND DEPRESSION: Primary | ICD-10-CM

## 2023-09-14 DIAGNOSIS — F41.9 ANXIETY AND DEPRESSION: Primary | ICD-10-CM

## 2023-09-15 ENCOUNTER — TELEPHONE (OUTPATIENT)
Dept: FAMILY MEDICINE | Facility: OTHER | Age: 23
End: 2023-09-15
Payer: COMMERCIAL

## 2023-09-15 DIAGNOSIS — F32.A ANXIETY AND DEPRESSION: Primary | ICD-10-CM

## 2023-09-15 DIAGNOSIS — F41.9 ANXIETY AND DEPRESSION: Primary | ICD-10-CM

## 2023-09-15 NOTE — TELEPHONE ENCOUNTER
MTM referral from: [facility] visit (referral by provider)    MTM referral outreach attempt #1 on September 15, 2023 at 1:52 PM      Outcome: Patient is not interested at this time because insurance, will route to MTM Pharmacist/Provider as an FYI. Thank you for the referral.     Use private pay for the carrier/Plan on the flowsheet    FRANKY Chen     Reason for Referral:   Medication Management and psychological services as needed

## 2023-11-02 NOTE — NURSING NOTE
"Chief Complaint   Patient presents with     Musculoskeletal Problem     left hand     Patient is here for pain in her left hand that started Saturday after she fell on it. Patient states she had ibuprofen at 10am with no relief.     Initial /78   Pulse 119   Temp 97.3  F (36.3  C) (Tympanic)   Resp 16   Ht 1.683 m (5' 6.25\")   Wt 58.2 kg (128 lb 6.4 oz)   SpO2 99%   BMI 20.57 kg/m   Estimated body mass index is 20.57 kg/m  as calculated from the following:    Height as of this encounter: 1.683 m (5' 6.25\").    Weight as of this encounter: 58.2 kg (128 lb 6.4 oz).  Medication Reconciliation: complete    Monica Martinez LPN  " Negative

## 2024-02-19 ENCOUNTER — LAB (OUTPATIENT)
Dept: LAB | Facility: OTHER | Age: 24
End: 2024-02-19
Attending: INTERNAL MEDICINE
Payer: COMMERCIAL

## 2024-02-19 DIAGNOSIS — R21 RASH: ICD-10-CM

## 2024-02-19 DIAGNOSIS — R53.82 CHRONIC FATIGUE: ICD-10-CM

## 2024-02-19 DIAGNOSIS — R76.8 ANA POSITIVE: ICD-10-CM

## 2024-02-19 DIAGNOSIS — R23.1 LIVEDO RETICULARIS: ICD-10-CM

## 2024-02-19 LAB
ALBUMIN SERPL BCG-MCNC: 5.1 G/DL (ref 3.5–5.2)
ALBUMIN UR-MCNC: NEGATIVE MG/DL
ALP SERPL-CCNC: 62 U/L (ref 40–150)
ALT SERPL W P-5'-P-CCNC: 14 U/L (ref 0–50)
ANION GAP SERPL CALCULATED.3IONS-SCNC: 11 MMOL/L (ref 7–15)
APPEARANCE UR: CLEAR
AST SERPL W P-5'-P-CCNC: 17 U/L (ref 0–45)
BASOPHILS # BLD AUTO: 0.1 10E3/UL (ref 0–0.2)
BASOPHILS NFR BLD AUTO: 1 %
BILIRUB SERPL-MCNC: 0.4 MG/DL
BILIRUB UR QL STRIP: NEGATIVE
BUN SERPL-MCNC: 18.9 MG/DL (ref 6–20)
CALCIUM SERPL-MCNC: 9.5 MG/DL (ref 8.6–10)
CHLORIDE SERPL-SCNC: 104 MMOL/L (ref 98–107)
COLOR UR AUTO: YELLOW
CREAT SERPL-MCNC: 0.85 MG/DL (ref 0.51–0.95)
CRP SERPL-MCNC: <3 MG/L
DEPRECATED HCO3 PLAS-SCNC: 26 MMOL/L (ref 22–29)
EGFRCR SERPLBLD CKD-EPI 2021: >90 ML/MIN/1.73M2
EOSINOPHIL # BLD AUTO: 0.1 10E3/UL (ref 0–0.7)
EOSINOPHIL NFR BLD AUTO: 2 %
ERYTHROCYTE [DISTWIDTH] IN BLOOD BY AUTOMATED COUNT: 12.5 % (ref 10–15)
ERYTHROCYTE [SEDIMENTATION RATE] IN BLOOD BY WESTERGREN METHOD: 1 MM/HR (ref 0–20)
GLUCOSE SERPL-MCNC: 105 MG/DL (ref 70–99)
GLUCOSE UR STRIP-MCNC: NEGATIVE MG/DL
HCT VFR BLD AUTO: 42 % (ref 35–47)
HGB BLD-MCNC: 14.7 G/DL (ref 11.7–15.7)
HGB UR QL STRIP: NEGATIVE
IMM GRANULOCYTES # BLD: 0 10E3/UL
IMM GRANULOCYTES NFR BLD: 0 %
KETONES UR STRIP-MCNC: NEGATIVE MG/DL
LEUKOCYTE ESTERASE UR QL STRIP: NEGATIVE
LYMPHOCYTES # BLD AUTO: 2.2 10E3/UL (ref 0.8–5.3)
LYMPHOCYTES NFR BLD AUTO: 34 %
MCH RBC QN AUTO: 31.1 PG (ref 26.5–33)
MCHC RBC AUTO-ENTMCNC: 35 G/DL (ref 31.5–36.5)
MCV RBC AUTO: 89 FL (ref 78–100)
MONOCYTES # BLD AUTO: 0.5 10E3/UL (ref 0–1.3)
MONOCYTES NFR BLD AUTO: 7 %
NEUTROPHILS # BLD AUTO: 3.6 10E3/UL (ref 1.6–8.3)
NEUTROPHILS NFR BLD AUTO: 56 %
NITRATE UR QL: NEGATIVE
NRBC # BLD AUTO: 0 10E3/UL
NRBC BLD AUTO-RTO: 0 /100
PH UR STRIP: 5.5 [PH] (ref 5–9)
PLATELET # BLD AUTO: 285 10E3/UL (ref 150–450)
POTASSIUM SERPL-SCNC: 3.8 MMOL/L (ref 3.4–5.3)
PROT SERPL-MCNC: 7.5 G/DL (ref 6.4–8.3)
RBC # BLD AUTO: 4.73 10E6/UL (ref 3.8–5.2)
SODIUM SERPL-SCNC: 141 MMOL/L (ref 135–145)
SP GR UR STRIP: 1.01 (ref 1–1.03)
TSH SERPL DL<=0.005 MIU/L-ACNC: 1.69 UIU/ML (ref 0.3–4.2)
UROBILINOGEN UR STRIP-MCNC: NORMAL MG/DL
VIT B12 SERPL-MCNC: 702 PG/ML (ref 232–1245)
WBC # BLD AUTO: 6.5 10E3/UL (ref 4–11)

## 2024-02-19 PROCEDURE — 86038 ANTINUCLEAR ANTIBODIES: CPT | Mod: ZL

## 2024-02-19 PROCEDURE — 82607 VITAMIN B-12: CPT | Mod: ZL

## 2024-02-19 PROCEDURE — 86146 BETA-2 GLYCOPROTEIN ANTIBODY: CPT | Mod: ZL

## 2024-02-19 PROCEDURE — 86147 CARDIOLIPIN ANTIBODY EA IG: CPT | Mod: ZL

## 2024-02-19 PROCEDURE — 85049 AUTOMATED PLATELET COUNT: CPT | Mod: ZL

## 2024-02-19 PROCEDURE — 86160 COMPLEMENT ANTIGEN: CPT | Mod: ZL

## 2024-02-19 PROCEDURE — 84443 ASSAY THYROID STIM HORMONE: CPT | Mod: ZL

## 2024-02-19 PROCEDURE — 85652 RBC SED RATE AUTOMATED: CPT | Mod: ZL

## 2024-02-19 PROCEDURE — 36415 COLL VENOUS BLD VENIPUNCTURE: CPT | Mod: ZL

## 2024-02-19 PROCEDURE — 82306 VITAMIN D 25 HYDROXY: CPT | Mod: ZL

## 2024-02-19 PROCEDURE — 85390 FIBRINOLYSINS SCREEN I&R: CPT | Mod: 26 | Performed by: PATHOLOGY

## 2024-02-19 PROCEDURE — 86140 C-REACTIVE PROTEIN: CPT | Mod: ZL

## 2024-02-19 PROCEDURE — 85730 THROMBOPLASTIN TIME PARTIAL: CPT | Mod: ZL

## 2024-02-19 PROCEDURE — 80053 COMPREHEN METABOLIC PANEL: CPT | Mod: ZL

## 2024-02-19 PROCEDURE — 84207 ASSAY OF VITAMIN B-6: CPT | Mod: ZL

## 2024-02-19 PROCEDURE — 81003 URINALYSIS AUTO W/O SCOPE: CPT | Mod: ZL

## 2024-02-20 LAB — VIT D+METAB SERPL-MCNC: 24 NG/ML (ref 20–50)

## 2024-02-21 LAB
ANA PAT SER IF-IMP: ABNORMAL
ANA SER QL IF: POSITIVE
ANA TITR SER IF: ABNORMAL {TITER}
B2 GLYCOPROT1 IGG SERPL IA-ACNC: 1.3 U/ML
B2 GLYCOPROT1 IGM SERPL IA-ACNC: <2.4 U/ML
C3 SERPL-MCNC: 105 MG/DL (ref 81–157)
C4 SERPL-MCNC: 24 MG/DL (ref 13–39)
CARDIOLIPIN IGG SER IA-ACNC: 3.9 GPL-U/ML
CARDIOLIPIN IGG SER IA-ACNC: NEGATIVE
CARDIOLIPIN IGM SER IA-ACNC: 2.9 MPL-U/ML
CARDIOLIPIN IGM SER IA-ACNC: NEGATIVE
DRVVT SCREEN RATIO: 1.02
INR PPP: 1.09 (ref 0.85–1.15)
LA PPP-IMP: NEGATIVE
LUPUS INTERPRETATION: NORMAL
PTT RATIO: 1.08
THROMBIN TIME: 16.3 SECONDS (ref 13–19)

## 2024-02-23 LAB — PYRIDOXAL PHOS SERPL-SCNC: 47 NMOL/L

## 2024-02-24 ENCOUNTER — HEALTH MAINTENANCE LETTER (OUTPATIENT)
Age: 24
End: 2024-02-24

## 2024-02-25 ENCOUNTER — HOSPITAL ENCOUNTER (OUTPATIENT)
Dept: GENERAL RADIOLOGY | Facility: OTHER | Age: 24
Discharge: HOME | End: 2024-02-25
Attending: NURSE PRACTITIONER
Payer: COMMERCIAL

## 2024-02-25 ENCOUNTER — OFFICE VISIT (OUTPATIENT)
Dept: FAMILY MEDICINE | Facility: OTHER | Age: 24
End: 2024-02-25
Payer: COMMERCIAL

## 2024-02-25 VITALS
DIASTOLIC BLOOD PRESSURE: 82 MMHG | HEART RATE: 98 BPM | SYSTOLIC BLOOD PRESSURE: 124 MMHG | HEIGHT: 67 IN | OXYGEN SATURATION: 98 % | TEMPERATURE: 98 F | RESPIRATION RATE: 16 BRPM | WEIGHT: 137.6 LBS | BODY MASS INDEX: 21.6 KG/M2

## 2024-02-25 DIAGNOSIS — S60.221A CONTUSION OF MULTIPLE SITES OF RIGHT HAND AND FINGERS, INITIAL ENCOUNTER: Primary | ICD-10-CM

## 2024-02-25 DIAGNOSIS — S60.00XA CONTUSION OF MULTIPLE SITES OF RIGHT HAND AND FINGERS, INITIAL ENCOUNTER: Primary | ICD-10-CM

## 2024-02-25 DIAGNOSIS — S69.91XA HAND INJURY, RIGHT, INITIAL ENCOUNTER: ICD-10-CM

## 2024-02-25 PROCEDURE — 99213 OFFICE O/P EST LOW 20 MIN: CPT | Performed by: NURSE PRACTITIONER

## 2024-02-25 PROCEDURE — 73130 X-RAY EXAM OF HAND: CPT | Mod: RT

## 2024-02-25 ASSESSMENT — PATIENT HEALTH QUESTIONNAIRE - PHQ9
SUM OF ALL RESPONSES TO PHQ QUESTIONS 1-9: 0
10. IF YOU CHECKED OFF ANY PROBLEMS, HOW DIFFICULT HAVE THESE PROBLEMS MADE IT FOR YOU TO DO YOUR WORK, TAKE CARE OF THINGS AT HOME, OR GET ALONG WITH OTHER PEOPLE: NOT DIFFICULT AT ALL
SUM OF ALL RESPONSES TO PHQ QUESTIONS 1-9: 0

## 2024-02-25 ASSESSMENT — ASTHMA QUESTIONNAIRES
ACT_TOTALSCORE: 20
ACT_TOTALSCORE: 20
QUESTION_4 LAST FOUR WEEKS HOW OFTEN HAVE YOU USED YOUR RESCUE INHALER OR NEBULIZER MEDICATION (SUCH AS ALBUTEROL): ONCE A WEEK OR LESS
QUESTION_5 LAST FOUR WEEKS HOW WOULD YOU RATE YOUR ASTHMA CONTROL: WELL CONTROLLED
QUESTION_1 LAST FOUR WEEKS HOW MUCH OF THE TIME DID YOUR ASTHMA KEEP YOU FROM GETTING AS MUCH DONE AT WORK, SCHOOL OR AT HOME: A LITTLE OF THE TIME
QUESTION_2 LAST FOUR WEEKS HOW OFTEN HAVE YOU HAD SHORTNESS OF BREATH: ONCE OR TWICE A WEEK
QUESTION_3 LAST FOUR WEEKS HOW OFTEN DID YOUR ASTHMA SYMPTOMS (WHEEZING, COUGHING, SHORTNESS OF BREATH, CHEST TIGHTNESS OR PAIN) WAKE YOU UP AT NIGHT OR EARLIER THAN USUAL IN THE MORNING: ONCE OR TWICE

## 2024-02-25 ASSESSMENT — PAIN SCALES - GENERAL: PAINLEVEL: SEVERE PAIN (6)

## 2024-02-25 NOTE — PROGRESS NOTES
ASSESSMENT/PLAN:     I have reviewed the nursing notes.  I have reviewed the findings, diagnosis, plan and need for follow up with the patient.        1. Hand injury, right, initial encounter    - XR Hand Right G/E 3 Views    2. Contusion of multiple sites of right hand and fingers, initial encounter      - APPLY SHORT ARM SPLINT STATIC  - Orthopedic  Referral; Future      Xray of right hand completed and personally reviewed, appears to have linear irregularity of the 3rd proximal phalanx - most noted on lateral view, radiologist over read:  No acute fracture or dislocation. Joint spaces are well maintained.    Soft tissues appear normal.    Well padded RJ hand/wrist splint applied for comfort and support.  Recommend wearing until seen in follow up in 10 days.      No lifting or use of right hand.  Discussed importance of elevation to reduce swelling.  Frequent icing.   May use over-the-counter Tylenol or ibuprofen PRN     Return for follow up in 10 days         I explained my diagnostic considerations and recommendations to the patient, who voiced understanding and agreement with the treatment plan. All questions were answered. We discussed potential side effects of any prescribed or recommended therapies, as well as expectations for response to treatments.    Kenisha Pinedo NP  [facility]      SUBJECTIVE:   Marietta Lentz is a 23 year old female who presents to clinic today for the following health issues:  Hand injury      HPI  She was lifting free weights and slammed and jammed her hand between the weights and a bench.  Injury occurred 5 days ago.  Immediate pain and swelling.   Numbness in 2nd and 4th fingers.  Swelling is slowly decreasing.  No improvement in pain.  Right hand dominant.  She has been icing, elevating and using ACE wrap.  Taking Ibuprofen 400 mg BID.      Past Medical History:   Diagnosis Date    Acne     7/12/2013    Exercise-induced bronchospasm     EIB  "    Past Surgical History:   Procedure Laterality Date    ESOPHAGOSCOPY, GASTROSCOPY, DUODENOSCOPY (EGD), COMBINED      1/6/2016    TONSILLECTOMY, ADENOIDECTOMY, COMBINED      11/6/12,obstructive sleep symptoms     Social History     Tobacco Use    Smoking status: Never     Passive exposure: Past    Smokeless tobacco: Never    Tobacco comments:     Quit smoking: dad smokes outside   Substance Use Topics    Alcohol use: No     Alcohol/week: 0.0 standard drinks of alcohol     Current Outpatient Medications   Medication Sig Dispense Refill    albuterol (PROAIR HFA/PROVENTIL HFA/VENTOLIN HFA) 108 (90 Base) MCG/ACT inhaler Inhale 2 puffs into the lungs every 4 hours as needed for shortness of breath or wheezing 18 g 11    hydrOXYzine (ATARAX) 25 MG tablet TAKE 1-2 TABLETS BY MOUTH EVERY 6 HOURS AS NEEDED FOR ANXIETY 60 tablet 1    sertraline (ZOLOFT) 100 MG tablet Take 1 tablet (100 mg) by mouth daily 90 tablet 3    traZODone (DESYREL) 50 MG tablet Take 1 tablet (50 mg) by mouth At Bedtime 90 tablet 3     No Known Allergies      Past medical history, past surgical history, current medications and allergies reviewed and accurate to the best of my knowledge.        OBJECTIVE:     /82 (BP Location: Left arm, Patient Position: Chair, Cuff Size: Adult Regular)   Pulse 98   Temp 98  F (36.7  C) (Temporal)   Resp 16   Ht 1.702 m (5' 7\")   Wt 62.4 kg (137 lb 9.6 oz)   LMP 02/04/2024 (Within Days)   SpO2 98%   Breastfeeding No   BMI 21.55 kg/m    Body mass index is 21.55 kg/m .        Physical Exam  General Appearance: Well appearing adult female, appropriate appearance for age. No acute distress  Cardiac:  CMS intact to right upper extremity with brisk capillary refill and strong radial pulse   Musculoskeletal:  Equal movement of bilateral upper extremities with exception of right fingers and wrist.  Right wrist with decreased ROM to approximately half of normal.  Right fingers with significantly decreased flexion " and extension due to pain, guarding and stiffness.  Unable to complete oppositional movement of right fingers due to pain, guarding and stiffness.  Equal movement of bilateral lower extremities.  Normal gait.    Dermatological: right dorsal hand and wrist with diffuse erythematous bruising, skin intact without open wounds.  See attached photo.    Psychological: normal affect, alert, oriented, and pleasant.       Imaging:  Results for orders placed or performed in visit on 02/25/24   XR Hand Right G/E 3 Views     Status: None    Narrative    Exam: XR HAND RIGHT G/E 3 VIEWS    Technique: Right hand, 3 Views    Comparison: 5/31/2017    Exam reason: Hand injury, right, initial encounter    Findings:  No acute fracture or dislocation. Joint spaces are well maintained.      Soft tissues appear normal.      Impression    Impression:  No acute fracture or dislocation.    RAQUEL MINER MD         SYSTEM ID:  [id number]

## 2024-02-25 NOTE — NURSING NOTE
"Chief Complaint   Patient presents with    Hand Pain     Right Hand Pain           Initial /82 (BP Location: Left arm, Patient Position: Chair, Cuff Size: Adult Regular)   Pulse 98   Temp 98  F (36.7  C) (Temporal)   Resp 16   Ht 1.702 m (5' 7\")   Wt 62.4 kg (137 lb 9.6 oz)   LMP 02/04/2024 (Within Days)   SpO2 98%   Breastfeeding No   BMI 21.55 kg/m   Estimated body mass index is 21.55 kg/m  as calculated from the following:    Height as of this encounter: 1.702 m (5' 7\").    Weight as of this encounter: 62.4 kg (137 lb 9.6 oz).    FOOD SECURITY SCREENING QUESTIONS:    The next two questions are to help us understand your food security.  If you are feeling you need any assistance in this area, we have resources available to support you today.    Hunger Vital Signs:  Within the past 12 months we worried whether our food would run out before we got money to buy more. Never  Within the past 12 months the food we bought just didn't last and we didn't have money to get more. Never    Medication Reconciliation: Complete.       Jonelle Lock LPN on 2/25/2024 at 4:51 PM     "

## 2024-03-05 ENCOUNTER — OFFICE VISIT (OUTPATIENT)
Dept: FAMILY MEDICINE | Facility: OTHER | Age: 24
End: 2024-03-05
Attending: NURSE PRACTITIONER
Payer: COMMERCIAL

## 2024-03-05 ENCOUNTER — HOSPITAL ENCOUNTER (OUTPATIENT)
Dept: GENERAL RADIOLOGY | Facility: OTHER | Age: 24
Discharge: HOME | End: 2024-03-05
Attending: FAMILY MEDICINE
Payer: COMMERCIAL

## 2024-03-05 VITALS
TEMPERATURE: 98.7 F | WEIGHT: 139.2 LBS | HEIGHT: 67 IN | RESPIRATION RATE: 19 BRPM | OXYGEN SATURATION: 100 % | HEART RATE: 72 BPM | DIASTOLIC BLOOD PRESSURE: 72 MMHG | SYSTOLIC BLOOD PRESSURE: 111 MMHG | BODY MASS INDEX: 21.85 KG/M2

## 2024-03-05 DIAGNOSIS — S60.221A CONTUSION OF MULTIPLE SITES OF RIGHT HAND AND FINGERS, INITIAL ENCOUNTER: Primary | ICD-10-CM

## 2024-03-05 DIAGNOSIS — S60.00XA CONTUSION OF MULTIPLE SITES OF RIGHT HAND AND FINGERS, INITIAL ENCOUNTER: Primary | ICD-10-CM

## 2024-03-05 PROCEDURE — 73130 X-RAY EXAM OF HAND: CPT | Mod: RT

## 2024-03-05 PROCEDURE — 99213 OFFICE O/P EST LOW 20 MIN: CPT | Performed by: FAMILY MEDICINE

## 2024-03-05 ASSESSMENT — PAIN SCALES - GENERAL: PAINLEVEL: MODERATE PAIN (5)

## 2024-03-05 NOTE — NURSING NOTE
"Chief Complaint   Patient presents with    Hand Problem     Contusions of Multiple Sites of Right Hand, DOI: 2/20/2024   XRAY: 02/25/2024       Initial /72 (BP Location: Left arm, Patient Position: Sitting, Cuff Size: Adult Regular)   Pulse 72   Temp 98.7  F (37.1  C) (Temporal)   Resp 19   Ht 1.702 m (5' 7\")   Wt 63.1 kg (139 lb 3.2 oz)   LMP 03/01/2024 (Within Days)   SpO2 100%   Breastfeeding No   BMI 21.80 kg/m   Estimated body mass index is 21.8 kg/m  as calculated from the following:    Height as of this encounter: 1.702 m (5' 7\").    Weight as of this encounter: 63.1 kg (139 lb 3.2 oz).  Medication Review: complete      Randi Ahmadi      "

## 2024-03-05 NOTE — PROGRESS NOTES
"Sports Medicine Office Note    HPI:  23-year-old female coming in for evaluation of a right hand injury that occurred on .  She was at the gym, carrying a dumbbell, and her hand got pinched between the dumbbell and workout bench.  She had immediate pain.  On  she was seen in a [facility] walk-in clinic.  X-rays were negative.  She followed up in [facility] clinic on  and repeat x-rays were again negative.  She still has pain and swelling.  This is limiting movement/function.  She rates her pain at 5/10.  She characterizes the pain as dull, sharp, throbbing, and burning.  Prior to today's visit she was in a splint.  She has been using ice and OTC medications to help with her symptoms.  She has associated bruising and swelling.  She has a previous wrist injury 7 years ago.      EXAM:  /72 (BP Location: Left arm, Patient Position: Sitting, Cuff Size: Adult Regular)   Pulse 72   Temp 98.7  F (37.1  C) (Temporal)   Resp 19   Ht 1.702 m (5' 7\")   Wt 63.1 kg (139 lb 3.2 oz)   LMP 2024 (Within Days)   SpO2 100%   Breastfeeding No   BMI 21.80 kg/m    MUSCULOSKELETAL EXAM:  RIGHT HAND  Inspection:  -No gross deformity  -Mild swelling and moderate bruising throughout the right hand  -Scars:  None    Tenderness:  - Diffusely tender to palpation throughout the ulnar 2 fingers, volar and dorsal hand, and carpus  - Pain does extend up the ulnar forearm    Range of Motion:  - Difficulty fully flexing/extending digits due to pain    Strength:  - strength:  4-/5    Sensation:  -Intact to light touch in the radial, median, and ulnar nerve distributions    Motor:  -Intact AIN, PIN, and IO    Other:  -No signs of cyanosis. Normal skin temperature of the upper extremity.  -Elbow:  No gross deformity. Full range of motion.  -Left hand:  No gross deformity. No palpable tenderness. Normal strength and ROM.      IMAGIN2024: 3 view right hand x-ray  - No fracture, dislocation, or bony lesion    2024: " 3 view right hand x-ray  - No fracture, dislocation, or bony lesion    3/5/2024: 3 view right hand x-ray  - No fracture, dislocation, or bony lesion      ASSESSMENT/PLAN:  Diagnoses and all orders for this visit:  Contusion of multiple sites of right hand and fingers, initial encounter  -     Orthopedic  Referral  -     XR Hand Right G/E 3 Views    23-year-old female with a right hand contusion.  She has weakness and functional limitation secondary to pain.  She has intact sensation and motor function.  Blood flow preserved to all digits.  X-rays from 2/21, 2/25, and 3/5 were all personally reviewed in the office with findings as demonstrated above by my interpretation.  - Transition away from immobilization  - Begin as tolerated activities with the hand/wrist to help restore function  - Continue OTC APAP/ibuprofen as needed  - Follow-up as needed  - If symptoms do not begin improving over the next couple weeks, patient can follow-up and we will consider alternative management strategies which may include hand therapy or advanced imaging      Kennedy Griggs MD  3/5/2024  10:48 AM    Total time spent with this patient was 24 minutes which included chart review, visualization and independent interpretation of images, time spent with the patient, and documentation.    Procedure time:  0 minute(s)

## 2024-08-02 SDOH — HEALTH STABILITY: PHYSICAL HEALTH: ON AVERAGE, HOW MANY MINUTES DO YOU ENGAGE IN EXERCISE AT THIS LEVEL?: 90 MIN

## 2024-08-02 SDOH — HEALTH STABILITY: PHYSICAL HEALTH: ON AVERAGE, HOW MANY DAYS PER WEEK DO YOU ENGAGE IN MODERATE TO STRENUOUS EXERCISE (LIKE A BRISK WALK)?: 5 DAYS

## 2024-08-02 ASSESSMENT — SOCIAL DETERMINANTS OF HEALTH (SDOH): HOW OFTEN DO YOU GET TOGETHER WITH FRIENDS OR RELATIVES?: ONCE A WEEK

## 2024-08-05 ENCOUNTER — MYC REFILL (OUTPATIENT)
Dept: FAMILY MEDICINE | Facility: OTHER | Age: 24
End: 2024-08-05
Payer: COMMERCIAL

## 2024-08-05 DIAGNOSIS — F51.01 PRIMARY INSOMNIA: ICD-10-CM

## 2024-08-05 DIAGNOSIS — F41.9 ANXIETY AND DEPRESSION: ICD-10-CM

## 2024-08-05 DIAGNOSIS — F32.A ANXIETY AND DEPRESSION: ICD-10-CM

## 2024-08-07 RX ORDER — SERTRALINE HYDROCHLORIDE 100 MG/1
100 TABLET, FILM COATED ORAL DAILY
Qty: 90 TABLET | Refills: 0 | Status: SHIPPED | OUTPATIENT
Start: 2024-08-07

## 2024-08-07 RX ORDER — TRAZODONE HYDROCHLORIDE 50 MG/1
50 TABLET, FILM COATED ORAL AT BEDTIME
Qty: 90 TABLET | Refills: 0 | Status: SHIPPED | OUTPATIENT
Start: 2024-08-07

## 2024-08-07 RX ORDER — HYDROXYZINE HYDROCHLORIDE 25 MG/1
TABLET, FILM COATED ORAL
Qty: 60 TABLET | Refills: 0 | Status: SHIPPED | OUTPATIENT
Start: 2024-08-07

## 2024-08-07 NOTE — TELEPHONE ENCOUNTER
Requested Prescriptions   Pending Prescriptions Disp Refills    hydrOXYzine HCl (ATARAX) 25 MG tablet 60 tablet 1     Sig: TAKE 1-2 TABLETS BY MOUTH EVERY 6 HOURS AS NEEDED FOR ANXIETY   Last Prescription Date:   12/2/22  Last Fill Qty/Refills:         60, R-1        sertraline (ZOLOFT) 100 MG tablet 90 tablet 3     Sig: Take 1 tablet (100 mg) by mouth daily   Last Prescription Date:   1/10/23  Last Fill Qty/Refills:         90, R-3      SSRIs Protocol Failed - 8/7/2024 11:11 AM        Failed - JOSE-7 score of less than 5 in past 6 months.     Please review last JOSE-7 score.        traZODone (DESYREL) 50 MG tablet 90 tablet 3     Sig: Take 1 tablet (50 mg) by mouth at bedtime   Last Prescription Date:   1/10/23  Last Fill Qty/Refills:         90, R-3      Serotonin Modulators Failed - 8/7/2024 11:11 AM        Failed - JOSE-7 score of less than 5 in past 6 months.     Please review last JOSE-7 score.      Last Office Visit:              1/10/23 (Physical)   Future Office visit:             Next 5 appointments (look out 90 days)      Oct 29, 2024 2:20 PM  (Arrive by 2:05 PM)  Adult Preventative Visit with Alba Lin MD  [facility] ([facility] ) [address]  [place] [id number] [phone]     Unable to complete prescription refill per RN Medication Refill Policy. Ashley Perea RN .............. 8/7/2024  11:13 AM

## 2024-10-29 ENCOUNTER — OFFICE VISIT (OUTPATIENT)
Dept: FAMILY MEDICINE | Facility: OTHER | Age: 24
End: 2024-10-29
Attending: FAMILY MEDICINE
Payer: COMMERCIAL

## 2024-10-29 VITALS
SYSTOLIC BLOOD PRESSURE: 124 MMHG | WEIGHT: 143.6 LBS | HEART RATE: 96 BPM | OXYGEN SATURATION: 96 % | TEMPERATURE: 98.4 F | DIASTOLIC BLOOD PRESSURE: 70 MMHG | RESPIRATION RATE: 16 BRPM | BODY MASS INDEX: 22.54 KG/M2 | HEIGHT: 67 IN

## 2024-10-29 DIAGNOSIS — Z97.5 BREAKTHROUGH BLEEDING ASSOCIATED WITH INTRAUTERINE DEVICE (IUD): ICD-10-CM

## 2024-10-29 DIAGNOSIS — N92.1 BREAKTHROUGH BLEEDING ASSOCIATED WITH INTRAUTERINE DEVICE (IUD): ICD-10-CM

## 2024-10-29 DIAGNOSIS — Z11.8 SPECIAL SCREENING EXAMINATION FOR CHLAMYDIAL DISEASE: ICD-10-CM

## 2024-10-29 DIAGNOSIS — J45.990 EXERCISE-INDUCED BRONCHOSPASM: ICD-10-CM

## 2024-10-29 DIAGNOSIS — F41.9 ANXIETY AND DEPRESSION: ICD-10-CM

## 2024-10-29 DIAGNOSIS — Z11.59 NEED FOR HEPATITIS C SCREENING TEST: ICD-10-CM

## 2024-10-29 DIAGNOSIS — F32.A ANXIETY AND DEPRESSION: ICD-10-CM

## 2024-10-29 DIAGNOSIS — M67.431 GANGLION CYST OF WRIST, RIGHT: ICD-10-CM

## 2024-10-29 DIAGNOSIS — F51.01 PRIMARY INSOMNIA: ICD-10-CM

## 2024-10-29 DIAGNOSIS — Z11.4 SCREENING FOR HIV (HUMAN IMMUNODEFICIENCY VIRUS): ICD-10-CM

## 2024-10-29 DIAGNOSIS — Z00.00 ROUTINE GENERAL MEDICAL EXAMINATION AT A HEALTH CARE FACILITY: Primary | ICD-10-CM

## 2024-10-29 LAB
C TRACH DNA SPEC QL PROBE+SIG AMP: NEGATIVE
N GONORRHOEA DNA SPEC QL NAA+PROBE: NEGATIVE

## 2024-10-29 PROCEDURE — 90471 IMMUNIZATION ADMIN: CPT | Performed by: FAMILY MEDICINE

## 2024-10-29 PROCEDURE — 87389 HIV-1 AG W/HIV-1&-2 AB AG IA: CPT | Mod: ZL | Performed by: FAMILY MEDICINE

## 2024-10-29 PROCEDURE — 87491 CHLMYD TRACH DNA AMP PROBE: CPT | Mod: ZL | Performed by: FAMILY MEDICINE

## 2024-10-29 PROCEDURE — 99395 PREV VISIT EST AGE 18-39: CPT | Mod: 25 | Performed by: FAMILY MEDICINE

## 2024-10-29 PROCEDURE — 36415 COLL VENOUS BLD VENIPUNCTURE: CPT | Mod: ZL | Performed by: FAMILY MEDICINE

## 2024-10-29 PROCEDURE — 86803 HEPATITIS C AB TEST: CPT | Mod: ZL | Performed by: FAMILY MEDICINE

## 2024-10-29 PROCEDURE — 90656 IIV3 VACC NO PRSV 0.5 ML IM: CPT | Performed by: FAMILY MEDICINE

## 2024-10-29 PROCEDURE — 99213 OFFICE O/P EST LOW 20 MIN: CPT | Mod: 25 | Performed by: FAMILY MEDICINE

## 2024-10-29 RX ORDER — TRAZODONE HYDROCHLORIDE 50 MG/1
50 TABLET, FILM COATED ORAL AT BEDTIME
Qty: 90 TABLET | Refills: 4 | Status: SHIPPED | OUTPATIENT
Start: 2024-10-29

## 2024-10-29 RX ORDER — CYCLOBENZAPRINE HCL 5 MG
5 TABLET ORAL
COMMUNITY
Start: 2024-09-30 | End: 2025-10-05

## 2024-10-29 RX ORDER — HYDROXYZINE HYDROCHLORIDE 25 MG/1
TABLET, FILM COATED ORAL
Qty: 60 TABLET | Refills: 11 | Status: SHIPPED | OUTPATIENT
Start: 2024-10-29

## 2024-10-29 SDOH — HEALTH STABILITY: PHYSICAL HEALTH: ON AVERAGE, HOW MANY MINUTES DO YOU ENGAGE IN EXERCISE AT THIS LEVEL?: 90 MIN

## 2024-10-29 SDOH — HEALTH STABILITY: PHYSICAL HEALTH: ON AVERAGE, HOW MANY DAYS PER WEEK DO YOU ENGAGE IN MODERATE TO STRENUOUS EXERCISE (LIKE A BRISK WALK)?: 5 DAYS

## 2024-10-29 ASSESSMENT — ASTHMA QUESTIONNAIRES
QUESTION_1 LAST FOUR WEEKS HOW MUCH OF THE TIME DID YOUR ASTHMA KEEP YOU FROM GETTING AS MUCH DONE AT WORK, SCHOOL OR AT HOME: NONE OF THE TIME
QUESTION_3 LAST FOUR WEEKS HOW OFTEN DID YOUR ASTHMA SYMPTOMS (WHEEZING, COUGHING, SHORTNESS OF BREATH, CHEST TIGHTNESS OR PAIN) WAKE YOU UP AT NIGHT OR EARLIER THAN USUAL IN THE MORNING: ONCE OR TWICE
QUESTION_4 LAST FOUR WEEKS HOW OFTEN HAVE YOU USED YOUR RESCUE INHALER OR NEBULIZER MEDICATION (SUCH AS ALBUTEROL): ONCE A WEEK OR LESS
ACT_TOTALSCORE: 22
ACT_TOTALSCORE: 22
QUESTION_5 LAST FOUR WEEKS HOW WOULD YOU RATE YOUR ASTHMA CONTROL: COMPLETELY CONTROLLED
QUESTION_2 LAST FOUR WEEKS HOW OFTEN HAVE YOU HAD SHORTNESS OF BREATH: ONCE OR TWICE A WEEK

## 2024-10-29 ASSESSMENT — PAIN SCALES - GENERAL: PAINLEVEL_OUTOF10: MODERATE PAIN (4)

## 2024-10-29 ASSESSMENT — SOCIAL DETERMINANTS OF HEALTH (SDOH): HOW OFTEN DO YOU GET TOGETHER WITH FRIENDS OR RELATIVES?: ONCE A WEEK

## 2024-10-29 NOTE — PATIENT INSTRUCTIONS
Patient Education   Preventive Care Advice   This is general advice given by our system to help you stay healthy. However, your care team may have specific advice just for you. Please talk to your care team about your preventive care needs.  Nutrition  Eat 5 or more servings of fruits and vegetables each day.  Try wheat bread, brown rice and whole grain pasta (instead of white bread, rice, and pasta).  Get enough calcium and vitamin D. Check the label on foods and aim for 100% of the RDA (recommended daily allowance).  Lifestyle  Exercise at least 150 minutes each week  (30 minutes a day, 5 days a week).  Do muscle strengthening activities 2 days a week. These help control your weight and prevent disease.  No smoking.  Wear sunscreen to prevent skin cancer.  Have a dental exam and cleaning every 6 months.  Yearly exams  See your health care team every year to talk about:  Any changes in your health.  Any medicines your care team has prescribed.  Preventive care, family planning, and ways to prevent chronic diseases.  Shots (vaccines)   HPV shots (up to age 26), if you've never had them before.  Hepatitis B shots (up to age 59), if you've never had them before.  COVID-19 shot: Get this shot when it's due.  Flu shot: Get a flu shot every year.  Tetanus shot: Get a tetanus shot every 10 years.  Pneumococcal, hepatitis A, and RSV shots: Ask your care team if you need these based on your risk.  Shingles shot (for age 50 and up)  General health tests  Diabetes screening:  Starting at age 35, Get screened for diabetes at least every 3 years.  If you are younger than age 35, ask your care team if you should be screened for diabetes.  Cholesterol test: At age 39, start having a cholesterol test every 5 years, or more often if advised.  Bone density scan (DEXA): At age 50, ask your care team if you should have this scan for osteoporosis (brittle bones).  Hepatitis C: Get tested at least once in your life.  STIs (sexually  transmitted infections)  Before age 24: Ask your care team if you should be screened for STIs.  After age 24: Get screened for STIs if you're at risk. You are at risk for STIs (including HIV) if:  You are sexually active with more than one person.  You don't use condoms every time.  You or a partner was diagnosed with a sexually transmitted infection.  If you are at risk for HIV, ask about PrEP medicine to prevent HIV.  Get tested for HIV at least once in your life, whether you are at risk for HIV or not.  Cancer screening tests  Cervical cancer screening: If you have a cervix, begin getting regular cervical cancer screening tests starting at age 21.  Breast cancer scan (mammogram): If you've ever had breasts, begin having regular mammograms starting at age 40. This is a scan to check for breast cancer.  Colon cancer screening: It is important to start screening for colon cancer at age 45.  Have a colonoscopy test every 10 years (or more often if you're at risk) Or, ask your provider about stool tests like a FIT test every year or Cologuard test every 3 years.  To learn more about your testing options, visit:   .  For help making a decision, visit:   https://bit.ly/ug02832.  Prostate cancer screening test: If you have a prostate, ask your care team if a prostate cancer screening test (PSA) at age 55 is right for you.  Lung cancer screening: If you are a current or former smoker ages 50 to 80, ask your care team if ongoing lung cancer screenings are right for you.  For informational purposes only. Not to replace the advice of your health care provider. Copyright   2023 [facility]. All rights reserved. Clinically reviewed by the [employer] [facility]. ISpeak 031162 - REV 01/24.  Learning About Stress  What is stress?     Stress is your body's response to a hard situation. Your body can have a physical, emotional, or mental response. Stress is a fact of life for most people, and it  affects everyone differently. What causes stress for you may not be stressful for someone else.  A lot of things can cause stress. You may feel stress when you go on a job interview, take a test, or run a race. This kind of short-term stress is normal and even useful. It can help you if you need to work hard or react quickly. For example, stress can help you finish an important job on time.  Long-term stress is caused by ongoing stressful situations or events. Examples of long-term stress include long-term health problems, ongoing problems at work, or conflicts in your family. Long-term stress can harm your health.  How does stress affect your health?  When you are stressed, your body responds as though you are in danger. It makes hormones that speed up your heart, make you breathe faster, and give you a burst of energy. This is called the fight-or-flight stress response. If the stress is over quickly, your body goes back to normal and no harm is done.  But if stress happens too often or lasts too long, it can have bad effects. Long-term stress can make you more likely to get sick, and it can make symptoms of some diseases worse. If you tense up when you are stressed, you may develop neck, shoulder, or low back pain. Stress is linked to high blood pressure and heart disease.  Stress also harms your emotional health. It can make you wick, tense, or depressed. Your relationships may suffer, and you may not do well at work or school.  What can you do to manage stress?  You can try these things to help manage stress:   Do something active. Exercise or activity can help reduce stress. Walking is a great way to get started. Even everyday activities such as housecleaning or yard work can help.  Try yoga or roya chi. These techniques combine exercise and meditation. You may need some training at first to learn them.  Do something you enjoy. For example, listen to music or go to a movie. Practice your hobby or do volunteer  "work.  Meditate. This can help you relax, because you are not worrying about what happened before or what may happen in the future.  Do guided imagery. Imagine yourself in any setting that helps you feel calm. You can use online videos, books, or a teacher to guide you.  Do breathing exercises. For example:  From a standing position, bend forward from the waist with your knees slightly bent. Let your arms dangle close to the floor.  Breathe in slowly and deeply as you return to a standing position. Roll up slowly and lift your head last.  Hold your breath for just a few seconds in the standing position.  Breathe out slowly and bend forward from the waist.  Let your feelings out. Talk, laugh, cry, and express anger when you need to. Talking with supportive friends or family, a counselor, or a mary ann leader about your feelings is a healthy way to relieve stress. Avoid discussing your feelings with people who make you feel worse.  Write. It may help to write about things that are bothering you. This helps you find out how much stress you feel and what is causing it. When you know this, you can find better ways to cope.  What can you do to prevent stress?  You might try some of these things to help prevent stress:  Manage your time. This helps you find time to do the things you want and need to do.  Get enough sleep. Your body recovers from the stresses of the day while you are sleeping.  Get support. Your family, friends, and community can make a difference in how you experience stress.  Limit your news feed. Avoid or limit time on social media or news that may make you feel stressed.  Do something active. Exercise or activity can help reduce stress. Walking is a great way to get started.  Where can you learn more?  Go to https://www.[employer].net/patiented  Enter N032 in the search box to learn more about \"Learning About Stress.\"  Current as of: October 24, 2023  Content Version: 14.2 2024 [employer]. " "  Care instructions adapted under license by your healthcare professional. If you have questions about a medical condition or this instruction, always ask your healthcare professional. [employer] disclaims any warranty or liability for your use of this information.    9 Ways to Cut Back on Drinking  Maybe you've found yourself drinking more alcohol than you'd prefer. If you want to cut back, here are some ideas to try.    Think before you drink.  Do you really want a drink, or is it just a habit? If you're used to having a drink at a certain time, try doing something else then.     Look for substitutes.  Find some no-alcohol drinks that you enjoy, like flavored seltzer water, tea with honey, or tonic with a slice of lime. Or try alcohol-free beer or \"virgin\" cocktails (without the alcohol).     Drink more water.  Use water to quench your thirst. Drink a glass of water before you have any alcohol. Have another glass along with every drink or between drinks.     Shrink your drink.  For example, have a bottle of beer instead of a pint. Use a smaller glass for wine. Choose drinks with lower alcohol content (ABV%). Or use less liquor and more mixer in cocktails.     Slow down.  It's easy to drink quickly and without thinking about it. Pay attention, and make each drink last longer.     Do the math.  Total up how much you spend on alcohol each month. How much is that a year? If you cut back, what could you do with the money you save?     Take a break.  Choose a day or two each week when you won't drink at all. Notice how you feel on those days, physically and emotionally. How did you sleep? Do you feel better? Over time, add more break days.     Count calories.  Would you like to lose some weight? For some people that's a good motivator for cutting back. Figure out how many calories are in each drink. How many does that add up to in a day? In a week? In a month?     Practice saying no.  Be ready when someone " "offers you a drink. Try: \"Thanks, I've had enough.\" Or \"Thanks, but I'm cutting back.\" Or \"No, thanks. I feel better when I drink less.\"   Current as of: November 15, 2023  Content Version: 14.2 2024 [employer].   Care instructions adapted under license by your healthcare professional. If you have questions about a medical condition or this instruction, always ask your healthcare professional. [employer] disclaims any warranty or liability for your use of this information.  Safer Sex: Care Instructions  Overview  Safer sex is a way to reduce your risk of getting a sexually transmitted infection (STI). It can also help prevent pregnancy.  Several products can help you practice safer sex and reduce your chance of STIs. One of the best is a condom. There are internal and external condoms. You can use a special rubber sheet (dental dam) for protection during oral sex. Disposable gloves can keep your hands from touching blood, semen, or other body fluids that can carry infections.  Remember that birth control methods such as diaphragms, IUDs, foams, and birth control pills do not stop you from getting STIs.  Follow-up care is a key part of your treatment and safety. Be sure to make and go to all appointments, and call your doctor if you are having problems. It's also a good idea to know your test results and keep a list of the medicines you take.  How can you care for yourself at home?  Think about getting vaccinated to help prevent hepatitis A, hepatitis B, and human papillomavirus (HPV). They can be spread through sex.  Use a condom every time you have sex. Use an external condom, which goes on the penis. Or use an internal condom, which goes into the vagina or anus.  Make sure you use the right size external condom. A condom that's too small can break easily. A condom that's too big can slip off during sex.  Use a new condom each time you have sex. Be careful not to poke a hole in the " "condom when you open the wrapper.  Don't use an internal condom and an external condom at the same time.  Never use petroleum jelly (such as Vaseline), grease, hand lotion, baby oil, or anything with oil in it. These products can make holes in the condom.  After intercourse, hold the edge of the condom as you remove it. This will help keep semen from spilling out of the condom.  Do not have sex with anyone who has symptoms of an STI, such as sores on the genitals or mouth.  Do not drink a lot of alcohol or use drugs before sex.  Limit your sex partners. Sex with one partner who has sex only with you can reduce your risk of getting an STI.  Don't share sex toys. But if you do share them, use a condom and clean the sex toys between each use.  Talk to any partners before you have sex. Talk about what you feel comfortable with and whether you have any boundaries with sex. And find out if your partner or partners may be at risk for any STI. Keep in mind that a person may be able to spread an STI even if they do not have symptoms. You and any partners may want to get tested for STIs.  Where can you learn more?  Go to https://www.[employer].net/patiented  Enter B608 in the search box to learn more about \"Safer Sex: Care Instructions.\"  Current as of: November 27, 2023  Content Version: 14.2 2024 [facility].   Care instructions adapted under license by your healthcare professional. If you have questions about a medical condition or this instruction, always ask your healthcare professional. [employer] disclaims any warranty or liability for your use of this information.       "

## 2024-10-29 NOTE — NURSING NOTE
Chief Complaint   Patient presents with    Physical         Medication Reconciliation: complete    Génesis Caballero, LPN

## 2024-10-29 NOTE — LETTER
My Asthma Action Plan    Name: Marietta Lentz   YOB: 2000  Date: 10/29/2024   My doctor: Alba Lin MD   My clinic: [facility]        My Rescue Medicine:   Albuterol inhaler (Proair/Ventolin/Proventil HFA)  2-4 puffs EVERY 4 HOURS as needed. Use a spacer if recommended by your provider.   My Asthma Severity:   Intermittent / Exercise Induced  Know your asthma triggers: exercise or sports             GREEN ZONE   Good Control  I feel good  No cough or wheeze  Can work, sleep and play without asthma symptoms       Take your asthma control medicine every day.     If exercise triggers your asthma, take your rescue medication  15 minutes before exercise or sports, and  During exercise if you have asthma symptoms  Spacer to use with inhaler: If you have a spacer, make sure to use it with your inhaler             YELLOW ZONE Getting Worse  I have ANY of these:  I do not feel good  Cough or wheeze  Chest feels tight  Wake up at night   Keep taking your Green Zone medications  Start taking your rescue medicine:  every 20 minutes for up to 1 hour. Then every 4 hours for 24-48 hours.  If you stay in the Yellow Zone for more than 12-24 hours, contact your doctor.  If you do not return to the Green Zone in 12-24 hours or you get worse, start taking your oral steroid medicine if prescribed by your provider.           RED ZONE Medical Alert - Get Help  I have ANY of these:  I feel awful  Medicine is not helping  Breathing getting harder  Trouble walking or talking  Nose opens wide to breathe       Take your rescue medicine NOW  If your provider has prescribed an oral steroid medicine, start taking it NOW  Call your doctor NOW  If you are still in the Red Zone after 20 minutes and you have not reached your doctor:  Take your rescue medicine again and  Call 911 or go to the emergency room right away    See your regular doctor within 2 weeks of an Emergency Room or Urgent Care visit  for follow-up treatment.          Annual Reminders:  Meet with Asthma Educator,  Flu Shot in the Fall, consider Pneumonia Vaccination for patients with asthma (aged 19 and older).    Pharmacy[facility]    [id number] ([facility] - [place], [place] - [address]  [facility] - [place], [place] - [address]  [id number] - [place], [place] - [address]    Electronically signed by Alba Lin MD   Date: 10/29/24                    Asthma Triggers  How To Control Things That Make Your Asthma Worse    Triggers are things that make your asthma worse.  Look at the list below to help you find your triggers and   what you can do about them. You can help prevent asthma flare-ups by staying away from your triggers.      Trigger                                                          What you can do   Cigarette Smoke  Tobacco smoke can make asthma worse. Do not allow smoking in your home, car or around you.  Be sure no one smokes at a child s day care or school.  If you smoke, ask your health care provider for ways to help you quit.  Ask family members to quit too.  Ask your health care provider for a referral to Quit Plan to help you quit smoking, or call 5-946-514-PLAN.     Colds, Flu, Bronchitis  These are common triggers of asthma. Wash your hands often.  Don t touch your eyes, nose or mouth.  Get a flu shot every year.     Dust Mites  These are tiny bugs that live in cloth or carpet. They are too small to see. Wash sheets and blankets in hot water every week.   Encase pillows and mattress in dust mite proof covers.  Avoid having carpet if you can. If you have carpet, vacuum weekly.   Use a dust mask and HEPA vacuum.   Pollen and Outdoor Mold  Some people are allergic to trees, grass, or weed pollen, or molds. Try to keep your windows closed.  Limit time out doors when pollen count is high.   Ask you health care provider about taking medicine during allergy  season.     Animal Dander  Some people are allergic to skin flakes, urine or saliva from pets with fur or feathers. Keep pets with fur or feathers out of your home.    If you can t keep the pet outdoors, then keep the pet out of your bedroom.  Keep the bedroom door closed.  Keep pets off cloth furniture and away from stuffed toys.     Mice, Rats, and Cockroaches  Some people are allergic to the waste from these pests.   Cover food and garbage.  Clean up spills and food crumbs.  Store grease in the refrigerator.   Keep food out of the bedroom.   Indoor Mold  This can be a trigger if your home has high moisture. Fix leaking faucets, pipes, or other sources of water.   Clean moldy surfaces.  Dehumidify basement if it is damp and smelly.   Smoke, Strong Odors, and Sprays  These can reduce air quality. Stay away from strong odors and sprays, such as perfume, powder, hair spray, paints, smoke incense, paint, cleaning products, candles and new carpet.   Exercise or Sports  Some people with asthma have this trigger. Be active!  Ask your doctor about taking medicine before sports or exercise to prevent symptoms.    Warm up for 5-10 minutes before and after sports or exercise.     Other Triggers of Asthma  Cold air:  Cover your nose and mouth with a scarf.  Sometimes laughing or crying can be a trigger.  Some medicines and food can trigger asthma.

## 2024-10-29 NOTE — PROGRESS NOTES
Preventive Care Visit  [facility] AND [facility]  Alba Lin MD, Family Medicine  Oct 29, 2024        Coni Mcmanus is a 24 year old, presenting for the following:  Physical        10/29/2024     2:16 PM   Additional Questions   Roomed by Génesis Caballero        Marietta is here today for a physical.      Periods have been heavy and last a long time.  Bleeds 7-20 days.  Only has 5-7 days off between them.  Her cycles are extremely irregular.  Has a mirena.  Was placed in 8/2021.  She has had some spotting intermittently on a frequent basis ever since it was placed, but this is much heavier and frequent in the past year. There is a family history of DVT in her mom and other family members on mom's side.    Had a right hand injury in 2/2024.  Was pinched between some weights in the gym.  She had an x-ray and did not see any fracture, but had a lot of soft tissue swelling.  Followed up here at [facility] and had another x-ray and this was also normal.  Has a big lump on her wrist dorsum.  Gets discolored at times.  No change in sensation or temperature.    Health Care Directive  Patient does not have a Health Care Directive: Discussed advance care planning with patient; however, patient declined at this time.      10/29/2024   General Health   How would you rate your overall physical health? Good   Feel stress (tense, anxious, or unable to sleep) Rather much      (!) STRESS CONCERN      10/29/2024   Nutrition   Three or more servings of calcium each day? Yes   Diet: Gluten-free/reduced   How many servings of fruit and vegetables per day? (!) 2-3   How many sweetened beverages each day? 0-1            10/29/2024   Exercise   Days per week of moderate/strenous exercise 5 days   Average minutes spent exercising at this level 90 min            10/29/2024   Social Factors   Frequency of gathering with friends or relatives Once a week   Worry food won't last until get money to buy more No   Food not last or not  have enough money for food? No   Do you have housing? (Housing is defined as stable permanent housing and does not include staying ouside in a car, in a tent, in an abandoned building, in an overnight shelter, or couch-surfing.) Yes   Are you worried about losing your housing? No   Lack of transportation? No   Unable to get utilities (heat,electricity)? No            10/29/2024   Dental   Dentist two times every year? Yes            8/2/2024   TB Screening   Were you born outside of the [place]? No            Today's PHQ-2 Score:       10/29/2024     2:10 PM   PHQ-2 ( 1999 [employer])   Q1: Little interest or pleasure in doing things 0    Q2: Feeling down, depressed or hopeless 0    PHQ-2 Score 0    Q1: Little interest or pleasure in doing things Not at all   Q2: Feeling down, depressed or hopeless Not at all   PHQ-2 Score 0       Patient-reported           10/29/2024   Substance Use   Alcohol more than 3/day or more than 7/wk Yes   How often do you have a drink containing alcohol 2 to 4 times a month   How many alcohol drinks on typical day 5 or 6   How often do you have 5+ drinks at one occasion Weekly   Audit 2/3 Score 5   How often not able to stop drinking once started Never   How often failed to do what normally expected Never   How often needed first drink in am after a heavy drinking session Never   How often feeling of guilt or remorse after drinking Never   How often unable to remember what happened the night before Less than monthly   Have you or someone else been injured because of your drinking No   Has anyone been concerned or suggested you cut down on drinking No   TOTAL SCORE - AUDIT 8   Do you use any other substances recreationally? No        Social History     Tobacco Use    Smoking status: Never     Passive exposure: Past    Smokeless tobacco: Never    Tobacco comments:     Quit smoking: dad smokes outside   Vaping Use    Vaping status: Never Used   Substance Use Topics    Alcohol use: No      Alcohol/week: 0.0 standard drinks of alcohol    Drug use: Never             10/29/2024   One time HIV Screening   Previous HIV test? I don't know          10/29/2024   STI Screening   New sexual partner(s) since last STI/HIV test? (!) YES         History of abnormal Pap smear: No - age 21-29 PAP every 3 years recommended        8/19/2021     3:37 PM   PAP / HPV   PAP Negative for Intraepithelial Lesion or Malignancy (NILM)            10/29/2024   Contraception/Family Planning   Questions about contraception or family planning (!) YES            Reviewed and updated as needed this visit by Provider                    Past Medical History:   Diagnosis Date    Acne     7/12/2013    Exercise-induced bronchospasm     EIB     Past Surgical History:   Procedure Laterality Date    ESOPHAGOSCOPY, GASTROSCOPY, DUODENOSCOPY (EGD), COMBINED      1/6/2016    TONSILLECTOMY, ADENOIDECTOMY, COMBINED      11/6/12,obstructive sleep symptoms     BP Readings from Last 3 Encounters:   10/29/24 124/70   03/05/24 111/72   02/25/24 124/82    Wt Readings from Last 3 Encounters:   10/29/24 65.1 kg (143 lb 9.6 oz)   03/05/24 63.1 kg (139 lb 3.2 oz)   02/25/24 62.4 kg (137 lb 9.6 oz)                  Patient Active Problem List   Diagnosis    Acne vulgaris    Exercise-induced bronchospasm    Patella-femoral syndrome    Anxiety and depression    Encounter for insertion of intrauterine contraceptive device (IUD)    TOBY positive     Past Surgical History:   Procedure Laterality Date    ESOPHAGOSCOPY, GASTROSCOPY, DUODENOSCOPY (EGD), COMBINED      1/6/2016    TONSILLECTOMY, ADENOIDECTOMY, COMBINED      11/6/12,obstructive sleep symptoms       Social History     Tobacco Use    Smoking status: Never     Passive exposure: Past    Smokeless tobacco: Never    Tobacco comments:     Quit smoking: dad smokes outside   Substance Use Topics    Alcohol use: No     Alcohol/week: 0.0 standard drinks of alcohol     Family History   Problem Relation Age of Onset  "   Blood Disease Mother         Blood Disease,h/o Protein S and ? Factor 5 Leiden    Deep Vein Thrombosis Mother         after a foot surgery    Rheumatoid Arthritis Maternal Aunt     Genetic Disorder Other         Genetic,h/o anxiety and depression         Current Outpatient Medications   Medication Sig Dispense Refill    albuterol (PROAIR HFA/PROVENTIL HFA/VENTOLIN HFA) 108 (90 Base) MCG/ACT inhaler Inhale 2 puffs into the lungs every 4 hours as needed for shortness of breath or wheezing 18 g 11    cyclobenzaprine (FLEXERIL) 5 MG tablet Take 5 mg by mouth.      hydrOXYzine HCl (ATARAX) 25 MG tablet TAKE 1-2 TABLETS BY MOUTH EVERY 6 HOURS AS NEEDED FOR ANXIETY 60 tablet 11    norgestrel-ethinyl estradiol (LO/OVRAL) 0.3-30 MG-MCG tablet Take 1 tablet by mouth daily. Omit placebo pills. 84 tablet 0    traZODone (DESYREL) 50 MG tablet Take 1 tablet (50 mg) by mouth at bedtime. 90 tablet 4     No Known Allergies      Review of Systems  Constitutional, HEENT, cardiovascular, pulmonary, GI, , musculoskeletal, neuro, skin, endocrine and psych systems are negative, except as otherwise noted.     Objective    Exam  Pulse 96   Temp 98.4  F (36.9  C) (Tympanic)   Resp 16   Ht 1.702 m (5' 7\")   Wt 65.1 kg (143 lb 9.6 oz)   LMP 10/21/2024 (Approximate)   SpO2 96%   Breastfeeding No   BMI 22.49 kg/m     Estimated body mass index is 22.49 kg/m  as calculated from the following:    Height as of this encounter: 1.702 m (5' 7\").    Weight as of this encounter: 65.1 kg (143 lb 9.6 oz).    Physical Exam  Constitutional:       Appearance: Normal appearance. She is well-developed.   HENT:      Head: Normocephalic.      Right Ear: Tympanic membrane and external ear normal.      Left Ear: Tympanic membrane and external ear normal.      Nose: Nose normal.      Mouth/Throat:      Mouth: Mucous membranes are moist.      Pharynx: No oropharyngeal exudate or posterior oropharyngeal erythema.   Eyes:      Extraocular Movements: " Extraocular movements intact.      Pupils: Pupils are equal, round, and reactive to light.   Neck:      Thyroid: No thyromegaly.   Cardiovascular:      Rate and Rhythm: Normal rate and regular rhythm.      Heart sounds: Normal heart sounds. No murmur heard.  Pulmonary:      Effort: Pulmonary effort is normal. No respiratory distress.      Breath sounds: Normal breath sounds. No wheezing or rales.   Chest:      Chest wall: No tenderness.   Abdominal:      General: Bowel sounds are normal. There is no distension.      Palpations: Abdomen is soft. There is no mass.      Tenderness: There is no abdominal tenderness. There is no guarding or rebound.   Musculoskeletal:         General: No tenderness or deformity.      Cervical back: Normal range of motion and neck supple.      Comments: Right hand dorsum with ganglion cyst over central carpal bones.   Lymphadenopathy:      Cervical: No cervical adenopathy.   Skin:     General: Skin is warm and dry.   Neurological:      Mental Status: She is alert and oriented to person, place, and time.      Cranial Nerves: No cranial nerve deficit.      Coordination: Coordination normal.   Psychiatric:         Mood and Affect: Mood normal.         Behavior: Behavior normal.           ICD-10-CM    1. Routine general medical examination at a health care facility  Z00.00       2. Screening for HIV (human immunodeficiency virus)  Z11.4 HIV Screening     HIV Screening      3. Need for hepatitis C screening test  Z11.59 Hepatitis C Screen Reflex to HCV RNA Quant and Genotype     Hepatitis C Screen Reflex to HCV RNA Quant and Genotype      4. Special screening examination for chlamydial disease  Z11.8 GC/Chlamydia by PCR - [facility]     GC/Chlamydia by PCR - HI,[facility]      5. Breakthrough bleeding associated with intrauterine device (IUD)  N92.1 Ob/Gyn  Referral    Z97.5 norgestrel-ethinyl estradiol (LO/OVRAL) 0.3-30 MG-MCG tablet      6. Ganglion cyst of wrist, right  M67.431 Orthopedic   Referral      7. Exercise-induced bronchospasm  J45.990       8. Anxiety and depression  F41.9 hydrOXYzine HCl (ATARAX) 25 MG tablet    F32.A       9. Primary insomnia  F51.01 traZODone (DESYREL) 50 MG tablet           Pap Smear is due, but she did not want to do this today as she has her period.  Flu shot updated today.  Declined covid and prevnar vaccines.  Tdap is up to date (7/16/21).   Declines other labs other than those ordered below.  Screening for HIV as above.  Hepatitis C screening ordered as above.  Gonorrhea/Chlamydia screening ordered as above.  Trial of lo-ovral on top of mirena.  Recommend taking continuously, omitting placebo pills.  Also referred to OB GYN for discussion of other treatment that might eliminate her breakthrough bleeding.  With family history of clotting issues, ongoing use of estrogen containing oral contraception may need to be used with care.  Consider repeat Pap Smear when sees OB GYN if able to do at that visit.  Referred to Orthopedics.  Stable.  Does not need albuterol refill.  Asthma Action Plan updated today.  Stable.  Refills as above.  Stable.  Refills as above.      Return in about 53 weeks (around 11/4/2025) for Annual Wellness Visit.           Alba Lin MD

## 2024-10-31 LAB
HCV AB SERPL QL IA: NONREACTIVE
HIV 1+2 AB+HIV1 P24 AG SERPL QL IA: NONREACTIVE

## 2024-11-07 ASSESSMENT — PATIENT HEALTH QUESTIONNAIRE - PHQ9: SUM OF ALL RESPONSES TO PHQ QUESTIONS 1-9: 16

## 2025-01-13 ENCOUNTER — MYC MEDICAL ADVICE (OUTPATIENT)
Dept: FAMILY MEDICINE | Facility: OTHER | Age: 25
End: 2025-01-13
Payer: COMMERCIAL

## 2025-01-13 DIAGNOSIS — R76.8 ANA POSITIVE: ICD-10-CM

## 2025-01-13 DIAGNOSIS — M25.50 POLYARTHRALGIA: Primary | ICD-10-CM

## 2025-01-14 NOTE — TELEPHONE ENCOUNTER
Pt is requesting rheumatologist referral. Pt was also in ER due to frequent urination. States she isn't going to make appt with you to follow up unless provider thinks otherwise.     Has had referral to rheumatologist in the past.     Referral ellen'd up.     Routing to provider to review and respond.    Abhishek Self RN on 1/14/2025 at 10:29 AM

## 2025-01-14 NOTE — TELEPHONE ENCOUNTER
"Referred to Rheumatology at [place].  We also do have a new rheumatologist starting her next month and please let her know about that if she would rather try to be seen locally.      For the urinary frequency, would recommend drinking plenty of water to keep urine dilute as sometimes more concentrated urine can be irritating to the bladder wall.  I would also recommend that she google \"foods that are bladder irritants\" as there may be foods/drinks she is consuming that may be adding to the issue as well.  If she tries to make these adjustments and still has issues, she should be seen to discuss further.    Alba Lin MD   "

## 2025-01-29 ENCOUNTER — OFFICE VISIT (OUTPATIENT)
Dept: OBGYN | Facility: OTHER | Age: 25
End: 2025-01-29
Payer: COMMERCIAL

## 2025-01-29 VITALS
DIASTOLIC BLOOD PRESSURE: 80 MMHG | WEIGHT: 140 LBS | BODY MASS INDEX: 21.93 KG/M2 | HEART RATE: 106 BPM | SYSTOLIC BLOOD PRESSURE: 122 MMHG

## 2025-01-29 DIAGNOSIS — Z30.431 ENCOUNTER FOR ROUTINE CHECKING OF INTRAUTERINE CONTRACEPTIVE DEVICE (IUD): ICD-10-CM

## 2025-01-29 DIAGNOSIS — N89.8 VAGINAL DISCHARGE: Primary | ICD-10-CM

## 2025-01-29 LAB
BACTERIAL VAGINOSIS VAG-IMP: NEGATIVE
CANDIDA DNA VAG QL NAA+PROBE: NOT DETECTED
CANDIDA GLABRATA / CANDIDA KRUSEI DNA: NOT DETECTED
T VAGINALIS DNA VAG QL NAA+PROBE: NOT DETECTED

## 2025-01-29 PROCEDURE — 81515 NFCT DS BV&VAGINITIS DNA ALG: CPT | Mod: ZL

## 2025-01-29 ASSESSMENT — PAIN SCALES - GENERAL: PAINLEVEL_OUTOF10: NO PAIN (0)

## 2025-01-29 NOTE — PROGRESS NOTES
Patient presents to clinic for IUD check. Patient complains of yellow vaginal discharge that started 2 weeks ago.    Joaquin Robledo LPN...........................1/29/2025 1:43 PM

## 2025-01-29 NOTE — PROGRESS NOTES
Follow-Up Visit    S: Ms. Marietta Lentz is a 24 year old No obstetric history on file. here for IUD check. She had a mirena placed on 12/27/24. She reports no IUD concerns. She does not new abnormal discharge for two weeks. .     O:  /80   Pulse 106   Wt 63.5 kg (140 lb)   LMP 12/22/2024 (Approximate)   BMI 21.93 kg/m    Gen: Well-appearing, NAD  Pulm: nonlabored  Psych: appropriate mood and affect    Pelvic:  Normal appearing external female genitalia. Normal hair distribution. Vagina is without lesions. moderate discharge. Cervix irritated, IUD strings appear appropriate length    A/P:  Ms. Marietta Lentz is a 24 year old No obstetric history on file. here for IUD check. I have reviewed allergies, medication list, problem list, and past medical history. Discussed expected bleeding patterns. MVP collected to ruleout infection. Suspect this is the case today   - RTC 1 year for IUD check or sooner TOMASA Scales-C  1/29/2025 2:04 PM

## 2025-04-08 ENCOUNTER — MYC MEDICAL ADVICE (OUTPATIENT)
Dept: FAMILY MEDICINE | Facility: OTHER | Age: 25
End: 2025-04-08
Payer: COMMERCIAL

## 2025-04-08 DIAGNOSIS — F41.9 ANXIETY AND DEPRESSION: Primary | ICD-10-CM

## 2025-04-08 DIAGNOSIS — F32.A ANXIETY AND DEPRESSION: Primary | ICD-10-CM

## 2025-04-08 RX ORDER — SERTRALINE HYDROCHLORIDE 25 MG/1
25 TABLET, FILM COATED ORAL DAILY
Qty: 30 TABLET | Refills: 0 | Status: SHIPPED | OUTPATIENT
Start: 2025-04-08

## 2025-04-08 NOTE — TELEPHONE ENCOUNTER
Signed. Did sertraline historically work well for her?     Recommend 25 mg once daily for 1 week, then may increase to 50 mg and take until she follows up. Recommend follow up visit to see how she is doing in 1 month as you mentioned.     Génesis Campos NP on 4/8/2025 at 1:42 PM

## 2025-04-08 NOTE — TELEPHONE ENCOUNTER
Wants to get back on Sertraline.     Was on Sertraline 100 mg daily. Stopped on 10/29/24.    LOV 10/29/24    Malcom'd up order for Sertaline 25 mg. Recommend OV in a month to follow up.     Routing to provider to review and respond.  Odessa Covarrubias RN on 4/8/2025 at 12:28 PM

## 2025-05-12 DIAGNOSIS — F41.9 ANXIETY AND DEPRESSION: ICD-10-CM

## 2025-05-12 DIAGNOSIS — F32.A ANXIETY AND DEPRESSION: ICD-10-CM

## 2025-05-14 RX ORDER — SERTRALINE HYDROCHLORIDE 25 MG/1
25 TABLET, FILM COATED ORAL DAILY
Qty: 30 TABLET | Refills: 0 | Status: SHIPPED | OUTPATIENT
Start: 2025-05-14

## 2025-05-14 NOTE — TELEPHONE ENCOUNTER
[id number] - [place], [place] - [address] AT [facility] [address]  sent Rx request for the following:      Requested Prescriptions   Pending Prescriptions Disp Refills    sertraline (ZOLOFT) 25 MG tablet [Pharmacy Med Name: SERTRALINE 25MG TABLETS] 30 tablet 0     Sig: TAKE 1 TABLET(25 MG) BY MOUTH DAILY       SSRIs Protocol Failed - 5/14/2025  8:19 AM        Failed - PHQ-9 score less than 5 in past 6 months     Please review last PHQ-9 score.       1/10/2023     2:04 PM 1/10/2023     2:11 PM 2/25/2024     4:48 PM   PHQ-9 SCORE   PHQ-9 Total Score MyChart 15 (Moderately severe depression)  0   PHQ-9 Total Score 15 16 0             Failed - JOSE-7 score of less than 5 in past 6 months.     Please review last JOSE-7 score.       6/18/2019     2:35 PM 7/16/2021    10:02 AM 8/19/2021     3:01 PM   JOSE-7 SCORE   Total Score   4 (minimal anxiety)   Total Score 19 0 4          Anxiety and depression [F41.9, F32.A]  - Primary        Last Prescription Date:   4/8/25  Last Fill Qty/Refills:         30, R-0    Last Office Visit:                10/29/24 (px)   Prescribed 4/8 MyChart encounter. Pt is to have a follow up in 1 month  Future Office visit:            None    Unable to complete prescription refill per RN Medication Refill Policy.     Pt due for follow up. Routing to provider for refill consideration. Routing to Unit scheduling pool, to assist Pt in scheduling appointment.

## 2025-06-24 ENCOUNTER — OFFICE VISIT (OUTPATIENT)
Dept: FAMILY MEDICINE | Facility: OTHER | Age: 25
End: 2025-06-24
Attending: FAMILY MEDICINE
Payer: COMMERCIAL

## 2025-06-24 VITALS
OXYGEN SATURATION: 99 % | RESPIRATION RATE: 18 BRPM | SYSTOLIC BLOOD PRESSURE: 114 MMHG | TEMPERATURE: 97.7 F | WEIGHT: 138.5 LBS | BODY MASS INDEX: 21.69 KG/M2 | DIASTOLIC BLOOD PRESSURE: 68 MMHG | HEART RATE: 92 BPM

## 2025-06-24 DIAGNOSIS — F41.9 ANXIETY AND DEPRESSION: Primary | ICD-10-CM

## 2025-06-24 DIAGNOSIS — F32.A ANXIETY AND DEPRESSION: Primary | ICD-10-CM

## 2025-06-24 DIAGNOSIS — M45.8 ANKYLOSING SPONDYLITIS OF SACRAL REGION (H): ICD-10-CM

## 2025-06-24 DIAGNOSIS — N92.4 EXCESSIVE BLEEDING IN PREMENOPAUSAL PERIOD: ICD-10-CM

## 2025-06-24 DIAGNOSIS — N94.6 DYSMENORRHEA: ICD-10-CM

## 2025-06-24 RX ORDER — CELECOXIB 200 MG/1
200 CAPSULE ORAL
COMMUNITY
Start: 2025-05-05 | End: 2026-05-10

## 2025-06-24 RX ORDER — HYDROXYZINE HYDROCHLORIDE 25 MG/1
TABLET, FILM COATED ORAL
Qty: 60 TABLET | Refills: 11 | Status: SHIPPED | OUTPATIENT
Start: 2025-06-24

## 2025-06-24 ASSESSMENT — ASTHMA QUESTIONNAIRES
QUESTION_5 LAST FOUR WEEKS HOW WOULD YOU RATE YOUR ASTHMA CONTROL: COMPLETELY CONTROLLED
QUESTION_2 LAST FOUR WEEKS HOW OFTEN HAVE YOU HAD SHORTNESS OF BREATH: ONCE OR TWICE A WEEK
QUESTION_4 LAST FOUR WEEKS HOW OFTEN HAVE YOU USED YOUR RESCUE INHALER OR NEBULIZER MEDICATION (SUCH AS ALBUTEROL): NOT AT ALL
ACT_TOTALSCORE: 24
QUESTION_3 LAST FOUR WEEKS HOW OFTEN DID YOUR ASTHMA SYMPTOMS (WHEEZING, COUGHING, SHORTNESS OF BREATH, CHEST TIGHTNESS OR PAIN) WAKE YOU UP AT NIGHT OR EARLIER THAN USUAL IN THE MORNING: NOT AT ALL
QUESTION_1 LAST FOUR WEEKS HOW MUCH OF THE TIME DID YOUR ASTHMA KEEP YOU FROM GETTING AS MUCH DONE AT WORK, SCHOOL OR AT HOME: NONE OF THE TIME

## 2025-06-24 ASSESSMENT — ANXIETY QUESTIONNAIRES
GAD7 TOTAL SCORE: 7
2. NOT BEING ABLE TO STOP OR CONTROL WORRYING: SEVERAL DAYS
5. BEING SO RESTLESS THAT IT IS HARD TO SIT STILL: NOT AT ALL
3. WORRYING TOO MUCH ABOUT DIFFERENT THINGS: SEVERAL DAYS
4. TROUBLE RELAXING: MORE THAN HALF THE DAYS
8. IF YOU CHECKED OFF ANY PROBLEMS, HOW DIFFICULT HAVE THESE MADE IT FOR YOU TO DO YOUR WORK, TAKE CARE OF THINGS AT HOME, OR GET ALONG WITH OTHER PEOPLE?: SOMEWHAT DIFFICULT
7. FEELING AFRAID AS IF SOMETHING AWFUL MIGHT HAPPEN: NOT AT ALL
GAD7 TOTAL SCORE: 6
1. FEELING NERVOUS, ANXIOUS, OR ON EDGE: MORE THAN HALF THE DAYS
6. BECOMING EASILY ANNOYED OR IRRITABLE: SEVERAL DAYS
8. IF YOU CHECKED OFF ANY PROBLEMS, HOW DIFFICULT HAVE THESE MADE IT FOR YOU TO DO YOUR WORK, TAKE CARE OF THINGS AT HOME, OR GET ALONG WITH OTHER PEOPLE?: SOMEWHAT DIFFICULT
6. BECOMING EASILY ANNOYED OR IRRITABLE: MORE THAN HALF THE DAYS
4. TROUBLE RELAXING: SEVERAL DAYS
IF YOU CHECKED OFF ANY PROBLEMS ON THIS QUESTIONNAIRE, HOW DIFFICULT HAVE THESE PROBLEMS MADE IT FOR YOU TO DO YOUR WORK, TAKE CARE OF THINGS AT HOME, OR GET ALONG WITH OTHER PEOPLE: SOMEWHAT DIFFICULT
1. FEELING NERVOUS, ANXIOUS, OR ON EDGE: SEVERAL DAYS
7. FEELING AFRAID AS IF SOMETHING AWFUL MIGHT HAPPEN: NOT AT ALL
GAD7 TOTAL SCORE: 7
GAD7 TOTAL SCORE: 7
7. FEELING AFRAID AS IF SOMETHING AWFUL MIGHT HAPPEN: NOT AT ALL
2. NOT BEING ABLE TO STOP OR CONTROL WORRYING: SEVERAL DAYS
3. WORRYING TOO MUCH ABOUT DIFFERENT THINGS: SEVERAL DAYS
5. BEING SO RESTLESS THAT IT IS HARD TO SIT STILL: NOT AT ALL
7. FEELING AFRAID AS IF SOMETHING AWFUL MIGHT HAPPEN: NOT AT ALL
GAD7 TOTAL SCORE: 6

## 2025-06-24 ASSESSMENT — PATIENT HEALTH QUESTIONNAIRE - PHQ9
SUM OF ALL RESPONSES TO PHQ QUESTIONS 1-9: 13
SUM OF ALL RESPONSES TO PHQ QUESTIONS 1-9: 13
10. IF YOU CHECKED OFF ANY PROBLEMS, HOW DIFFICULT HAVE THESE PROBLEMS MADE IT FOR YOU TO DO YOUR WORK, TAKE CARE OF THINGS AT HOME, OR GET ALONG WITH OTHER PEOPLE: SOMEWHAT DIFFICULT

## 2025-06-24 ASSESSMENT — PAIN SCALES - GENERAL: PAINLEVEL_OUTOF10: NO PAIN (0)

## 2025-06-24 NOTE — PROGRESS NOTES
Nursing Notes:   Génesis Kendrick LPN  6/24/2025 11:07 AM  Sign at exiting of workspace  Chief Complaint   Patient presents with    Recheck Medication         Medication Reconciliation: complete    Génesis Kendrick LPN       Coni Mcmanus is a 25 year old, presenting for the following health issues:  Recheck Medication        6/24/2025    11:05 AM   Additional Questions   Roomed by Génesis Kendrick     Marietta is here today for follow up.  Needs a refill of sertraline and thinks she needs a higher dose.  Both her anxiety and depression have both been a little worse than usual.  No unusual stressors.  Wakes up more through the night, but did this prior to starting it as well.    She has a mirena IUD.  She is still getting periods and break through bleeding.  Very crampy when she is bleeding.  Bloated often.  Has had her new iud a couple of months ago.  The new one doesn't seem to bee working well for her.     Recently was diagnosed with ankylosing spondylitis.  Sees Rheumatology in [place].  Only taking celebrex for her pain.  Has been avoiding gluten and alcohol, which both seem to cause flares.          1/10/2023     2:11 PM 2/25/2024     4:48 PM 6/24/2025    10:54 AM   PHQ   PHQ-9 Total Score 16 0 13    Q9: Thoughts of better off dead/self-harm past 2 weeks Not at all Not at all  Not at all       Patient-reported    Proxy-reported         8/19/2021     3:01 PM 6/24/2025     9:55 AM 6/24/2025    10:55 AM   JOSE-7 SCORE   Total Score 4 (minimal anxiety) 6 (mild anxiety) 7 (mild anxiety)   Total Score 4 6  7        Patient-reported           History of Present Illness       Mental Health Follow-up:  Patient presents to follow-up on Depression & Anxiety.  The patient is having other symptoms associated with depression.  Patient's anxiety since last visit has been:  Medium  The patient is having other symptoms associated with anxiety.  Any significant life events: No  Patient is feeling anxious or having panic  attacks.  Patient has no concerns about alcohol or drug use.    She eats 2-3 servings of fruits and vegetables daily.She consumes 0 sweetened beverage(s) daily.She exercises with enough effort to increase her heart rate 30 to 60 minutes per day.  She exercises with enough effort to increase her heart rate 5 days per week.   She is taking medications regularly.          Depression and Anxiety   How are you doing with your depression since your last visit? Worsened   How are you doing with your anxiety since your last visit?  Worsened   Are you having other symptoms that might be associated with depression or anxiety? No  Have you had a significant life event? No   Do you have any concerns with your use of alcohol or other drugs? No    Social History     Tobacco Use    Smoking status: Never     Passive exposure: Past    Smokeless tobacco: Never    Tobacco comments:     Quit smoking: dad smokes outside   Vaping Use    Vaping status: Never Used   Substance Use Topics    Alcohol use: No     Alcohol/week: 0.0 standard drinks of alcohol    Drug use: Never         1/10/2023     2:11 PM 2/25/2024     4:48 PM 6/24/2025    10:54 AM   PHQ   PHQ-9 Total Score 16 0 13    Q9: Thoughts of better off dead/self-harm past 2 weeks Not at all Not at all  Not at all       Patient-reported    Proxy-reported         8/19/2021     3:01 PM 6/24/2025     9:55 AM 6/24/2025    10:55 AM   JOSE-7 SCORE   Total Score 4 (minimal anxiety) 6 (mild anxiety) 7 (mild anxiety)   Total Score 4 6  7        Patient-reported           Review of Systems  Constitutional, HEENT, cardiovascular, pulmonary, GI, , musculoskeletal, neuro, skin, endocrine and psych systems are negative, except as otherwise noted.      Objective    /68   Pulse 92   Temp 97.7  F (36.5  C) (Tympanic)   Resp 18   Wt 62.8 kg (138 lb 8 oz)   LMP 06/17/2025 (Approximate)   SpO2 99%   Breastfeeding No   BMI 21.69 kg/m    Body mass index is 21.69 kg/m .  Physical  Exam  Constitutional:       Appearance: Normal appearance.   HENT:      Head: Normocephalic.   Eyes:      Extraocular Movements: Extraocular movements intact.      Pupils: Pupils are equal, round, and reactive to light.   Cardiovascular:      Rate and Rhythm: Normal rate and regular rhythm.      Heart sounds: Normal heart sounds. No murmur heard.  Pulmonary:      Effort: Pulmonary effort is normal.      Breath sounds: Normal breath sounds. No wheezing, rhonchi or rales.   Musculoskeletal:      Cervical back: Normal range of motion and neck supple.      Right lower leg: No edema.      Left lower leg: No edema.   Lymphadenopathy:      Cervical: No cervical adenopathy.   Neurological:      Mental Status: She is alert.   Psychiatric:         Mood and Affect: Mood normal.         Behavior: Behavior normal.            ICD-10-CM    1. Anxiety and depression  F41.9 sertraline (ZOLOFT) 50 MG tablet    F32.A hydrOXYzine HCl (ATARAX) 25 MG tablet      2. Dysmenorrhea  N94.6 Ob/Gyn  Referral      3. Excessive bleeding in premenopausal period  N92.4 Ob/Gyn  Referral      4. Ankylosing spondylitis of sacral region (H)  M45.8            Dose of sertraline increased to 50 mg daily from 25 mg.  She wishes to follow-up only as needed.  Hydroxyzine refilled as well.  She is referred back to OB/GYN for further discussion since the IUD she has in place is not taking care of of her ongoing bleeding issues.  See #2.  As above, she was recently diagnosed with ankylosing spondylitis.  She is following with rheumatology for this issue.    She declines Prevnar and COVID vaccines today.    No follow-ups on file.     The longitudinal plan of care for the diagnosis(es)/condition(s) as documented were addressed during this visit. Due to the added complexity in care, I will continue to support Marietta in the subsequent management and with ongoing continuity of care.      Alba Lin MD

## 2025-06-24 NOTE — NURSING NOTE
Chief Complaint   Patient presents with    Recheck Medication         Medication Reconciliation: complete    Génesis Caballero, LPN

## 2025-06-25 ASSESSMENT — ANXIETY QUESTIONNAIRES: GAD7 TOTAL SCORE: 7
